# Patient Record
Sex: MALE | Race: BLACK OR AFRICAN AMERICAN | NOT HISPANIC OR LATINO | Employment: FULL TIME | URBAN - METROPOLITAN AREA
[De-identification: names, ages, dates, MRNs, and addresses within clinical notes are randomized per-mention and may not be internally consistent; named-entity substitution may affect disease eponyms.]

---
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
## 2019-12-27 ENCOUNTER — APPOINTMENT (OUTPATIENT)
Dept: GENERAL RADIOLOGY | Facility: CLINIC | Age: 19
End: 2019-12-27
Attending: INTERNAL MEDICINE

## 2019-12-27 ENCOUNTER — HOSPITAL ENCOUNTER (EMERGENCY)
Facility: CLINIC | Age: 19
Discharge: HOME | End: 2019-12-27
Attending: INTERNAL MEDICINE | Admitting: INTERNAL MEDICINE

## 2019-12-27 VITALS
WEIGHT: 214.29 LBS | HEIGHT: 74 IN | SYSTOLIC BLOOD PRESSURE: 140 MMHG | OXYGEN SATURATION: 100 % | DIASTOLIC BLOOD PRESSURE: 80 MMHG | RESPIRATION RATE: 16 BRPM | BODY MASS INDEX: 27.5 KG/M2 | TEMPERATURE: 97.2 F

## 2019-12-27 DIAGNOSIS — M54.50 ACUTE MIDLINE LOW BACK PAIN WITHOUT SCIATICA: ICD-10-CM

## 2019-12-27 PROCEDURE — 99283 EMERGENCY DEPT VISIT LOW MDM: CPT

## 2019-12-27 PROCEDURE — 72100 X-RAY EXAM L-S SPINE 2/3 VWS: CPT

## 2019-12-27 RX ORDER — IBUPROFEN 800 MG/1
800 TABLET, FILM COATED ORAL EVERY 8 HOURS PRN
Qty: 15 TABLET | Refills: 0 | Status: ON HOLD | OUTPATIENT
Start: 2019-12-27 | End: 2022-12-22

## 2019-12-27 ASSESSMENT — ENCOUNTER SYMPTOMS
WEAKNESS: 0
BACK PAIN: 1
ROS GI COMMENTS: NO BOWEL INCONTINENCE

## 2019-12-27 ASSESSMENT — MIFFLIN-ST. JEOR: SCORE: 2051.75

## 2019-12-27 NOTE — ED PROVIDER NOTES
"  History     Chief Complaint:  Back Pain    The history is provided by the patient.      Kristine Bravo is a 19 year old male who presents to the emergency department today for evaluation of back pain. The patient reports that he was playing basketball yesterday and when he jumped up he felt a sudden sharp pain in his lower back, there was no trauma or collision involved in the injury. He states the pain is intensified by moving his legs, but the pain does not radiate down into the lower extremities. The patient denies having any weakness and has not had any bowel or bladder incontinence. He states he has no history of back pain.     Allergies:  No Known Drug Allergies     Medications:    Albuterol inhaler     Past Medical History:    Asthma       Past Surgical History:    Surgical history reviewed. No pertinent surgical history.     Family History:    Family history reviewed. No pertinent family history.     Social History:  The patient was unaccompanied to the ED.  Smoking Status: Never Smoker  Smokeless Tobacco: Former User   Alcohol Use: Negative    Marital Status:  Single  The patient plays basketball.     Review of Systems   Gastrointestinal:        No bowel incontinence    Genitourinary:        No bladder incontinence    Musculoskeletal: Positive for back pain.        No leg pain    Neurological: Negative for weakness.         Physical Exam     Patient Vitals for the past 24 hrs:   BP Temp Temp src Heart Rate Resp SpO2 Height Weight   12/27/19 1126 (!) 140/80 97.2  F (36.2  C) Temporal 62 16 100 % 1.88 m (6' 2\") 97.2 kg (214 lb 4.6 oz)      Physical Exam  Abdominal:      Tenderness: There is no abdominal tenderness.   Musculoskeletal:      Lumbar back: He exhibits decreased range of motion, tenderness and spasm.      Comments: Straight leg raising negative for radicular pain   Skin:     General: Skin is warm and dry.   Neurological:      Mental Status: He is alert.      Sensory: No sensory deficit.      Deep " Tendon Reflexes: Reflexes are normal and symmetric.   Psychiatric:         Behavior: Behavior is cooperative.         Emergency Department Course     Imaging:  Radiology findings were communicated with the patient who voiced understanding of the findings.  XR, Lumbar Spine, 2-3 Views  There is normal alignment of the lumbar vertebrae. Vertebral body heights of the lumbar spine are normal. Lumbar intervertebral disc space heights are normal. There is no evidence for fracture of the lumbar spine.  Reading per radiology     Emergency Department Course:  1137 Nursing notes and vitals reviewed.  1140 I performed an exam of the patient as documented above.   1158 The patient was sent for an x-ray while in the emergency department, results above.    1236 Patient was rechecked and updated with imaging results.     Findings and plan explained to the Patient. Patient discharged home with instructions regarding supportive care, medications, and reasons to return. The importance of close follow-up was reviewed. The patient was prescribed Ibuprofen.     I personally reviewed the imaging results with the Patient and answered all related questions prior to discharge.      Impression & Plan      Medical Decision Making:  Kristine Bravo is a 19 year old male who presents to the emergency department complaining of low back pain after jumping for a basketball.  He does not have any red flags to suggest spinal cord compression or cauda equina syndrome.  No fever, sweats, or unintentional weight loss to suggest an infectious etiology.  X-ray does not show any significant spondylolisthesis or other abnormality.  I think we can manage symptomatically with close follow-up.  I will have the patient take ibuprofen, restrict activity with no basketball pending follow-up with orthopedics.  Back pain instructions, return if problems.      Diagnosis:    ICD-10-CM    1. Acute midline low back pain without sciatica M54.5        Disposition:  The  patient is discharged to home.      Discharge Medications:  New Prescriptions    IBUPROFEN (ADVIL/MOTRIN) 800 MG TABLET    Take 1 tablet (800 mg) by mouth every 8 hours as needed for moderate pain       Scribe Disclosure:  I, Ivon Parker, am serving as a scribe at 11:38 AM on 12/27/2019 to document services personally performed by Neda Camacho MD based on my observations and the provider's statements to me.    12/27/2019   [facility] EMERGENCY DEPARTMENT       Neda Camacho MD  12/27/19 4511

## 2019-12-27 NOTE — ED AVS SNAPSHOT
[facility] Emergency Department  [address]  [facility] [address]  Phone:  [phone]  Fax:  [phone]                                    Kristine Bravo   MRN: [id number]    Department:  [facility] Emergency Department   Date of Visit:  12/27/2019           After Visit Summary Signature Page    I have received my discharge instructions, and my questions have been answered. I have discussed any challenges I see with this plan with the nurse or doctor.    ..........................................................................................................................................  Patient/Patient Representative Signature      ..........................................................................................................................................  Patient Representative Print Name and Relationship to Patient    ..................................................               ................................................  Date                                   Time    ..........................................................................................................................................  Reviewed by Signature/Title    ...................................................              ..............................................  Date                                               Time          22EPIC Rev 08/18

## 2019-12-27 NOTE — DISCHARGE INSTRUCTIONS
Discharge Instructions  Back Pain  You were seen today for back pain. Back pain can have many causes, but most will get better without surgery or other specific treatment. Sometimes there is a herniated ( slipped ) disc. We don t usually do MRI scans to look for these right away, since most herniated discs will get better on their own with time.  Today, we did not find any evidence that your back pain was caused by a serious condition, such as an infection, fracture, or tumor. However, sometimes symptoms develop over time and cannot be found during an emergency visit, so it is very important that you follow up with your primary doctor.  Return to the Emergency Department if:  You develop a fever with your back pain.   You have weakness or change in sensation in one or both legs.  You lose control of your bowels or bladder, or can t empty your bladder.  Your pain gets much worse.     Follow-up with your doctor:  Unless your pain has completely gone away, please make an appointment with your doctor within one week.  You may need further management of your back pain, such as more pain medication, imaging such as an X-ray or MRI, or physical therapy.    What can I do to help myself?  Remain Active -- People are often afraid that they will hurt their back further or delay recovery by remaining active, but this is one of the best things you can do for your back. In fact, prolonged bed rest is not recommended. Studies have shown that people with low back pain recover faster when they remain active. Movement helps to bring blood flow to the muscles and relieve muscle spasms as well as preventing loss of muscle strength.  Heat -- Using a heating pad can help with low back pain during the first few weeks. Do not sleep with a heating pad, as you can be burned.   Pain medications - You may take a pain medication such as Tylenol  (acetaminophen), Advil , Nuprin  (ibuprofen) or Aleve  (naproxen).  If you have been given a  narcotic such as Vicodin  (hydrocodone with acetaminophen), Percocet  (oxycodone with acetaminophen), codeine, or a muscle relaxant such as Flexeril  (cyclobenzaprine) or Soma  (carisoprodol), do not drive for four hours after you have taken it. If the narcotic contains Tylenol  (acetaminophen), do not take Tylenol  with it. All narcotics will cause constipation, so eat a high fiber diet.   If you were given a prescription for medicine here today, be sure to read all of the information (including the package insert) that comes with your prescription.  This will include important information about the medicine, its side effects, and any warnings that you need to know about.  The pharmacist who fills the prescription can provide more information and answer questions you may have about the medicine.  If you have questions or concerns that the pharmacist cannot address, please call or return to the Emergency Department.   Opioid Medication Information    Pain medications are among the most commonly prescribed medicines, so we are including this information for all our patients. If you did not receive pain medication or get a prescription for pain medicine, you can ignore it.     You may have been given a prescription for an opioid (narcotic) pain medicine and/or have received a pain medicine while here in the Emergency Department. These medicines can make you drowsy or impaired. You must not drive, operate dangerous equipment, or engage in any other dangerous activities while taking these medications. If you drive while taking these medications, you could be arrested for DUI, or driving under the influence. Do not drink any alcohol while you are taking these medications.     Opioid pain medications can cause addiction. If you have a history of chemical dependency of any type, you are at a higher risk of becoming addicted to pain medications.  Only take these prescribed medications to treat your pain when all other  options have been tried. Take it for as short a time and as few doses as possible. Store your pain pills in a secure place, as they are frequently stolen and provide a dangerous opportunity for children or visitors in your house to start abusing these powerful medications. We will not replace any lost or stolen medicine.  As soon as your pain is better, you should flush all your remaining medication.     Many prescription pain medications contain Tylenol  (acetaminophen), including Vicodin , Tylenol #3 , Norco , Lortab , and Percocet .  You should not take any extra pills of Tylenol  if you are using these prescription medications or you can get very sick.  Do not ever take more than 3000 mg of acetaminophen in any 24 hour period.    All opioids tend to cause constipation. Drink plenty of water and eat foods that have a lot of fiber, such as fruits, vegetables, prune juice, apple juice and high fiber cereal.  Take a laxative if you don t move your bowels at least every other day. Miralax , Milk of Magnesia, Colace , or Senna  can be used to keep you regular.      Remember that you can always come back to the Emergency Department if you are not able to see your regular doctor in the amount of time listed above, if you get any new symptoms, or if there is anything that worries you.

## 2020-03-10 ENCOUNTER — HEALTH MAINTENANCE LETTER (OUTPATIENT)
Age: 20
End: 2020-03-10

## 2020-10-08 NOTE — TELEPHONE ENCOUNTER
FUTURE VISIT INFORMATION      FUTURE VISIT INFORMATION:    Date: 11.6.20    Time: 9:45    Location: [facility]  REFERRAL INFORMATION:    Referring provider:  N/A    Referring providers clinic:  N/A    Reason for visit/diagnosis  hair thinning, per patient, self refd, med recs located at [facility] and [facility].     RECORDS REQUESTED FROM:       Clinic name Comments Records Status Imaging Status    No previous records

## 2020-11-06 ENCOUNTER — PRE VISIT (OUTPATIENT)
Dept: DERMATOLOGY | Facility: CLINIC | Age: 20
End: 2020-11-06

## 2021-04-24 ENCOUNTER — HEALTH MAINTENANCE LETTER (OUTPATIENT)
Age: 21
End: 2021-04-24

## 2021-10-09 ENCOUNTER — HEALTH MAINTENANCE LETTER (OUTPATIENT)
Age: 21
End: 2021-10-09

## 2022-02-03 DIAGNOSIS — M26.29 APERTOGNATHIA: ICD-10-CM

## 2022-02-03 DIAGNOSIS — M26.212: Primary | ICD-10-CM

## 2022-03-18 DIAGNOSIS — Z11.59 ENCOUNTER FOR SCREENING FOR OTHER VIRAL DISEASES: Primary | ICD-10-CM

## 2022-05-16 ENCOUNTER — HEALTH MAINTENANCE LETTER (OUTPATIENT)
Age: 22
End: 2022-05-16

## 2022-09-11 ENCOUNTER — HEALTH MAINTENANCE LETTER (OUTPATIENT)
Age: 22
End: 2022-09-11

## 2022-10-15 ENCOUNTER — HOSPITAL ENCOUNTER (EMERGENCY)
Facility: CLINIC | Age: 22
Discharge: HOME | End: 2022-10-15
Attending: EMERGENCY MEDICINE | Admitting: EMERGENCY MEDICINE
Payer: COMMERCIAL

## 2022-10-15 ENCOUNTER — APPOINTMENT (OUTPATIENT)
Dept: GENERAL RADIOLOGY | Facility: CLINIC | Age: 22
End: 2022-10-15
Attending: EMERGENCY MEDICINE
Payer: COMMERCIAL

## 2022-10-15 VITALS
SYSTOLIC BLOOD PRESSURE: 134 MMHG | RESPIRATION RATE: 18 BRPM | OXYGEN SATURATION: 99 % | TEMPERATURE: 98 F | HEART RATE: 99 BPM | DIASTOLIC BLOOD PRESSURE: 79 MMHG

## 2022-10-15 DIAGNOSIS — S63.502A WRIST SPRAIN, LEFT, INITIAL ENCOUNTER: ICD-10-CM

## 2022-10-15 PROCEDURE — 73110 X-RAY EXAM OF WRIST: CPT | Mod: LT

## 2022-10-15 PROCEDURE — 250N000013 HC RX MED GY IP 250 OP 250 PS 637: Performed by: EMERGENCY MEDICINE

## 2022-10-15 PROCEDURE — 99283 EMERGENCY DEPT VISIT LOW MDM: CPT

## 2022-10-15 RX ORDER — ACETAMINOPHEN 325 MG/1
650 TABLET ORAL ONCE
Status: COMPLETED | OUTPATIENT
Start: 2022-10-15 | End: 2022-10-15

## 2022-10-15 RX ADMIN — ACETAMINOPHEN 650 MG: 325 TABLET, FILM COATED ORAL at 19:43

## 2022-10-15 ASSESSMENT — ENCOUNTER SYMPTOMS: ARTHRALGIAS: 1

## 2022-10-15 NOTE — ED TRIAGE NOTES
Pt reports that he was playing basketball and fell onto his left arm injuring his wrist. No deformity noted, PT CMS intact, injury happened 45 minutes PTA. VSS

## 2022-10-16 NOTE — ED PROVIDER NOTES
History   Chief Complaint:  Left Wrist Pain      The history is provided by the patient.      Kristine Bravo is a 22 year old male with history of asthma who presents with left wrist pain after falling onto his left arm while playing basketball approximately 1.5 hours ago. He denies elbow pain.     Review of Systems   Musculoskeletal: Positive for arthralgias (Left wrist).        - Elbow pain   neg numbness  Neg finger weakness  Neg skin laceration    Allergies:  No Known Allergies    Medications:  Albuterol neb solution  Albuterol Inhaler  Hydrocodone-acetaminophen  Loratadine   Montelukast   Prednisone  Cetirizine  Finasteride    Past Medical History:     Seasonal allergies  Asthma    Social History:  PCP: No Ref-Primary, Physician   Patient presents alone  Patient arrived by car    Physical Exam     Patient Vitals for the past 24 hrs:   BP Temp Temp src Pulse Resp SpO2   10/15/22 1846 134/79 98  F (36.7  C) Temporal 99 18 99 %       Physical Exam  Gen: alert, answers all questions appropriately  Left Upper Ext  MSK: full AROM all 5 digits, no rotational deformity, no snuff box tenderness, dorsal tenderness  Skin: no break in skin, no redness  Neuro: normal sensation to light touch in all 5 digits, normal strength and ROM of fingers  CV: 2+ radial pulse    Emergency Department Course     Imaging:  XR Wrist Left G/E 3 Views   Final Result   IMPRESSION: Normal joint spaces and alignment. No fracture.        Report per radiology    Emergency Department Course:     Reviewed:  I reviewed nursing notes, vitals, past medical history and Care Everywhere    Assessments:  1951 I obtained history and examined the patient as noted above.     Interventions:  1943 Acetaminophen 650 mg PO    Disposition:  The patient was discharged to home.     Impression & Plan     Medical Decision Making:  Kristine Bravo is a 22 year old male presents for evaluation after fall.  Signs and symptoms are consistent with a wrist sprain.  A broad  differential was considered including sprain, strain, fracture, tendon rupture, nerve impingement/compromise, referred pain. Supportive outpatient management is indicated.  Rest, ice, and elevation treatment was discussed with the patient.   Close follow-up with patient's primary care physician per discharge precautions. Contusion discharge instructions given for home.     Instructions to patient:  Wear wrist brace for 1 week.  Have recheck exam with primary care doctor.  No lifting left arm x1 week.  Take ibuprofen 600 mg 3x per day as needed.  This will provide both pain control and fight against inflammation.      Diagnosis:    ICD-10-CM    1. Wrist sprain, left, initial encounter  S63.502A         Scribe Disclosure:  I, Katja Mullins, am serving as a scribe at 7:50 PM on 10/15/2022 to document services personally performed by Maritza Albert MD based on my observations and the provider's statements to me.            Maritza Albert MD  10/15/22 1091

## 2022-10-16 NOTE — DISCHARGE INSTRUCTIONS
Wear wrist brace for 1 week.  Have recheck exam with primary care doctor.  No lifting left arm x1 week.  Take ibuprofen 600 mg 3x per day as needed.  This will provide both pain control and fight against inflammation.

## 2022-11-04 RX ORDER — PHENTERMINE HYDROCHLORIDE 37.5 MG/1
37.5 CAPSULE ORAL EVERY MORNING
Status: ON HOLD | COMMUNITY
End: 2022-12-22

## 2022-11-04 RX ORDER — FLUTICASONE PROPIONATE 50 MCG
SPRAY, SUSPENSION (ML) NASAL
COMMUNITY
Start: 2022-03-16

## 2022-11-04 RX ORDER — FINASTERIDE 1 MG/1
1 TABLET, FILM COATED ORAL DAILY
COMMUNITY
Start: 2022-09-01

## 2022-11-04 RX ORDER — CETIRIZINE HYDROCHLORIDE 10 MG/1
10 TABLET ORAL DAILY
COMMUNITY
Start: 2022-09-14

## 2022-11-04 NOTE — PRE-PROCEDURE
ORAL & MAXILLOFACIAL SURGERY   PREOPERATIVE  NOTE:  Kristine Bravo,  MRN: [id number],  : 2000      Date of Procedure:   2022    Pre-Operative Diagnosis:  1.Facial dysmorphia     Planned Procedure:  1.[place] I osteotomy  2.Bilateral mandibular sagittal split osteotomy     Planned Anesthesia: General via oral endotracheal tube    Attending Surgeon:  Dr. Marrero    Resident Surgeon:  Yana Calhoun DDS    Resident Assistants:  VIRIDIANA Robb DDS    Consent:  Written and verbal consent obtained from patient previously. Discussion included  risks/complications including but not limited post-operative pain, swelling, bleeding,  infection, hardware failure, nonunion, malunion, dehiscence of wounds,  temporary/permanent paresthesia/anesthesia of CN V3 mental nerve distribution/CN V2  maxillary nerve distribution, damage to CN VII (motor to muscles of facial expression)  with temporary or permanent paralysis, scar formation, malocclusion, failure to resolve  chief complaint, or need for additional procedures (occlusal equilibration/endodontic  therapy). Patient agrees to procedure as written, signed consent in chart.      Matti Santillan DDS  Oral & Maxillofacial Surgery, PGY-2    ---------------------------------------------------------------------------------------------------------------------

## 2022-12-08 NOTE — H&P
ORAL & MAXILLOFACIAL SURGERY   HISTORY & PHYSICAL  Kristine Bravo,  MRN: [id number],  : 2000        SEBASTIÁN Carmona is a 22-year-old M who presents to the Oral and Maxillofacial Surgery Department for evaluation of his skeletal-dental deformity. He was referred by his orthodontist, Dr. Sinha for preorthognathic evaluation. The patient voices multiple functional concerns regarding inability to properly bite, chew, and swallow food primarily due to inability to bring his front teeth together and states that a portion of the food is often left behind when attempting to bite into something. Patient reports occasional pain/discomfort with his periauricular area, neck, shoulders, teeth, eyes, and maxillary sinuses. He presents today for pre-operative evaluation.       HISTORY  Past Medical History:   Past Medical History:   Diagnosis Date     Uncomplicated asthma      Past Surgical History: History reviewed. No pertinent surgical history.  Medications:   No current facility-administered medications on file prior to encounter.  albuterol (PROVENTIL) (2.5 MG/3ML) 0.083% neb solution, Inhale 2.5 mg into the lungs  albuterol (VENTOLIN HFA) 108 (90 BASE) MCG/ACT Inhaler, Inhale 1-2 puffs into the lungs every 4 hours as needed for shortness of breath / dyspnea or wheezing  cetirizine (ZYRTEC) 10 MG tablet, Take 10 mg by mouth daily  finasteride (PROPECIA) 1 MG tablet, Take 1 tablet by mouth daily  fluticasone (FLONASE) 50 MCG/ACT nasal spray,   phentermine (ADIPEX-P) 37.5 MG capsule, Take 37.5 mg by mouth every morning  HYDROcodone-acetaminophen (NORCO) 5-325 MG per tablet, Take 1-2 tablets by mouth every 4 hours as needed for moderate to severe pain  ibuprofen (ADVIL/MOTRIN) 800 MG tablet, Take 1 tablet (800 mg) by mouth every 8 hours as needed for moderate pain  loratadine (CLARITIN) 10 MG tablet, Take 10 mg by mouth every 24 hours  montelukast (SINGULAIR) 10 MG tablet, Take 10 mg by mouth  predniSONE (DELTASONE) 20  "MG tablet, Take two tablets (= 40mg) each day for 5 (five) days     Patient reports he is no longer taking phentermine, and has stopped since 11/9/2022 due to delay of surgery. He reports he is now taking [id number] injections for weight loss management as well as Naltrexone for weight management and not as an opioid antagonist.  He reports that he is still taking cetrirzine and finasteride. Denies taking other medications at this time.     Allergies: No Known Allergies  Social History:   Social History     Socioeconomic History     Marital status: Single     Spouse name: Not on file     Number of children: Not on file     Years of education: Not on file     Highest education level: Not on file   Occupational History     Not on file   Tobacco Use     Smoking status: Never     Smokeless tobacco: Not on file   Substance and Sexual Activity     Alcohol use: No     Drug use: No     Sexual activity: Yes   Other Topics Concern     Not on file   Social History Narrative     Not on file     Social Determinants of Health     Financial Resource Strain: Not on file   Food Insecurity: Not on file   Transportation Needs: Not on file   Physical Activity: Not on file   Stress: Not on file   Social Connections: Not on file   Intimate Partner Violence: Not on file   Housing Stability: Not on file       REVIEW OF SYSTEMS  ROS reviewed and negative aside from listed in HPI    PHYSICAL EXAM  Vital Signs:   BP: 121/76  P:59  SpO2:99%  Height 6'2\"  Weight 220lbs    GEN: WD/WN male, NAD  HEENT Normocephalic, atraumatic, EOMI, PERRL no extraoral masses or lesion. No clicking/popping/crepitus of the bilateral temporomandibular joints, no preauricular tenderness noted, myalgia   Mallampati I  Otto classification I  Oral exam: ALBERTO 52 mm. No lesions of the maxillary or mandibular vestibules/buccal mucosa. Mucosa of the hard/soft palate, tongue, floor of mouth, posterior oropharynx pink and without masses/lesions. Floor of mouth soft, " non-tender, non-distended. Uvula midline.   CV: RRR, no M/G/R, warm and well-perfused  PULM: CTAB, breathing comfortably on room air  GI: Soft, ND/NT  MSK: BOSTON, no peripheral extremity edema  NEURO: AAOx4, CN II-XII intact bilaterally  PSYCH: Appropriate mood and affect            IMAGING RESULTS (Include outside hospital results)  EXAMINATION: CBCT scan of both the arches.    IMAGE QUALITY: Optimal for diagnosis.     IMAGING INDICATIONS:  To evaluate maxillofacial structures in preparation for orthognathic surgery.     PREVIOUS STUDIES: Previous CBCT scans acquired 1- and 2-8-2022 are available for comparison.     DENTITION:  The permanent maxillary lateral incisors appear missing. The primary maxillary canines appear retained. This accounting is different from previously reported.     DENTAL FINDINGS:   - Canine to canine anterior open bite.   - There is advanced apical root resorption of the retained primary maxillary canines.      OSSEOUS FINDINGS: Visible bony findings appear normal.      SINUSES and NASAL FOSSA: Visible sinonasal findings appear normal.      TMJs: Both condylar heads demonstrate slight flattening consistent with remodeling. The findings appear similar to that noted previously.      OTHER FINDINGS: Other facial bones, where visualized, appear normal.     RADIOGRAPHIC IMPRESSIONS:   1. Canine to canine anterior open bite.  2. 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  3. TMJ remodeling. This finding is considered within normal limits in the absence of TMJ symptoms.     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     PLAN  - OR for Lefort I and BSSO on 12/21/22  - Patient is to stop taking his Naltrexone and [id number] 7 DAYS prior to surgery.   - Continue taking finasteride and  cetrizine   - VSP plan given to patient      Discussed with chief resident and staff.    Yana Calhoun DDS  Oral & Maxillofacial Surgery, PGY-4  Pager: [phone]

## 2022-12-19 ENCOUNTER — LAB (OUTPATIENT)
Dept: LAB | Facility: CLINIC | Age: 22
End: 2022-12-19
Attending: FAMILY MEDICINE
Payer: COMMERCIAL

## 2022-12-19 DIAGNOSIS — Z01.818 PRE-OPERATIVE GENERAL PHYSICAL EXAMINATION: ICD-10-CM

## 2022-12-19 PROCEDURE — U0005 INFEC AGEN DETEC AMPLI PROBE: HCPCS

## 2022-12-19 PROCEDURE — U0003 INFECTIOUS AGENT DETECTION BY NUCLEIC ACID (DNA OR RNA); SEVERE ACUTE RESPIRATORY SYNDROME CORONAVIRUS 2 (SARS-COV-2) (CORONAVIRUS DISEASE [COVID-19]), AMPLIFIED PROBE TECHNIQUE, MAKING USE OF HIGH THROUGHPUT TECHNOLOGIES AS DESCRIBED BY CMS-2020-01-R: HCPCS

## 2022-12-20 LAB — SARS-COV-2 RNA RESP QL NAA+PROBE: NEGATIVE

## 2022-12-21 ENCOUNTER — ANESTHESIA EVENT (OUTPATIENT)
Dept: SURGERY | Facility: CLINIC | Age: 22
End: 2022-12-21
Payer: COMMERCIAL

## 2022-12-21 ENCOUNTER — HOSPITAL ENCOUNTER (INPATIENT)
Facility: CLINIC | Age: 22
LOS: 1 days | Discharge: HOME | End: 2022-12-22
Attending: DENTIST | Admitting: DENTIST
Payer: COMMERCIAL

## 2022-12-21 ENCOUNTER — ANESTHESIA (OUTPATIENT)
Dept: SURGERY | Facility: CLINIC | Age: 22
End: 2022-12-21
Payer: COMMERCIAL

## 2022-12-21 DIAGNOSIS — M26.02 MAXILLARY HYPOPLASIA: Primary | ICD-10-CM

## 2022-12-21 LAB — GLUCOSE BLDC GLUCOMTR-MCNC: 105 MG/DL (ref 70–99)

## 2022-12-21 PROCEDURE — 250N000009 HC RX 250: Performed by: DENTIST

## 2022-12-21 PROCEDURE — 250N000011 HC RX IP 250 OP 636: Performed by: NURSE ANESTHETIST, CERTIFIED REGISTERED

## 2022-12-21 PROCEDURE — 250N000024 HC ISOFLURANE, PER MIN: Performed by: DENTIST

## 2022-12-21 PROCEDURE — 250N000011 HC RX IP 250 OP 636: Performed by: DENTIST

## 2022-12-21 PROCEDURE — C1713 ANCHOR/SCREW BN/BN,TIS/BN: HCPCS | Performed by: DENTIST

## 2022-12-21 PROCEDURE — 258N000003 HC RX IP 258 OP 636: Performed by: DENTIST

## 2022-12-21 PROCEDURE — 272N000001 HC OR GENERAL SUPPLY STERILE: Performed by: DENTIST

## 2022-12-21 PROCEDURE — 360N000077 HC SURGERY LEVEL 4, PER MIN: Performed by: DENTIST

## 2022-12-21 PROCEDURE — 999N000141 HC STATISTIC PRE-PROCEDURE NURSING ASSESSMENT: Performed by: DENTIST

## 2022-12-21 PROCEDURE — 999N000157 HC STATISTIC RCP TIME EA 10 MIN

## 2022-12-21 PROCEDURE — 250N000013 HC RX MED GY IP 250 OP 250 PS 637: Performed by: DENTIST

## 2022-12-21 PROCEDURE — 250N000013 HC RX MED GY IP 250 OP 250 PS 637: Performed by: NURSE ANESTHETIST, CERTIFIED REGISTERED

## 2022-12-21 PROCEDURE — 250N000009 HC RX 250: Performed by: NURSE ANESTHETIST, CERTIFIED REGISTERED

## 2022-12-21 PROCEDURE — 710N000010 HC RECOVERY PHASE 1, LEVEL 2, PER MIN: Performed by: DENTIST

## 2022-12-21 PROCEDURE — 0NSV04Z REPOSITION LEFT MANDIBLE WITH INTERNAL FIXATION DEVICE, OPEN APPROACH: ICD-10-PCS | Performed by: DENTIST

## 2022-12-21 PROCEDURE — 250N000009 HC RX 250: Performed by: STUDENT IN AN ORGANIZED HEALTH CARE EDUCATION/TRAINING PROGRAM

## 2022-12-21 PROCEDURE — 250N000013 HC RX MED GY IP 250 OP 250 PS 637: Performed by: STUDENT IN AN ORGANIZED HEALTH CARE EDUCATION/TRAINING PROGRAM

## 2022-12-21 PROCEDURE — 0NSR04Z REPOSITION MAXILLA WITH INTERNAL FIXATION DEVICE, OPEN APPROACH: ICD-10-PCS | Performed by: DENTIST

## 2022-12-21 PROCEDURE — 258N000003 HC RX IP 258 OP 636: Performed by: NURSE ANESTHETIST, CERTIFIED REGISTERED

## 2022-12-21 PROCEDURE — 120N000002 HC R&B MED SURG/OB UMMC

## 2022-12-21 PROCEDURE — 0NST04Z REPOSITION RIGHT MANDIBLE WITH INTERNAL FIXATION DEVICE, OPEN APPROACH: ICD-10-PCS | Performed by: DENTIST

## 2022-12-21 PROCEDURE — 370N000017 HC ANESTHESIA TECHNICAL FEE, PER MIN: Performed by: DENTIST

## 2022-12-21 DEVICE — IMP SCR SYN MATRIX COARSE PITCH 1.85X16MM ST 04.511.216.01: Type: IMPLANTABLE DEVICE | Site: MANDIBLE | Status: FUNCTIONAL

## 2022-12-21 DEVICE — IMPLANTABLE DEVICE: Type: IMPLANTABLE DEVICE | Site: MAXILLA | Status: FUNCTIONAL

## 2022-12-21 RX ORDER — NALOXONE HYDROCHLORIDE 0.4 MG/ML
0.2 INJECTION, SOLUTION INTRAMUSCULAR; INTRAVENOUS; SUBCUTANEOUS
Status: DISCONTINUED | OUTPATIENT
Start: 2022-12-21 | End: 2022-12-22 | Stop reason: HOSPADM

## 2022-12-21 RX ORDER — GABAPENTIN 250 MG/5ML
100 SOLUTION ORAL EVERY 8 HOURS
Status: DISCONTINUED | OUTPATIENT
Start: 2022-12-21 | End: 2022-12-22 | Stop reason: HOSPADM

## 2022-12-21 RX ORDER — OXYCODONE HCL 5 MG/5 ML
5-10 SOLUTION, ORAL ORAL EVERY 6 HOURS PRN
Status: DISCONTINUED | OUTPATIENT
Start: 2022-12-21 | End: 2022-12-22 | Stop reason: HOSPADM

## 2022-12-21 RX ORDER — PROCHLORPERAZINE 25 MG
25 SUPPOSITORY, RECTAL RECTAL EVERY 12 HOURS PRN
Status: DISCONTINUED | OUTPATIENT
Start: 2022-12-21 | End: 2022-12-22 | Stop reason: HOSPADM

## 2022-12-21 RX ORDER — BENZOCAINE/MENTHOL 6 MG-10 MG
LOZENGE MUCOUS MEMBRANE EVERY 6 HOURS PRN
Status: DISCONTINUED | OUTPATIENT
Start: 2022-12-21 | End: 2022-12-22 | Stop reason: HOSPADM

## 2022-12-21 RX ORDER — HYDROMORPHONE HYDROCHLORIDE 1 MG/ML
0.2 INJECTION, SOLUTION INTRAMUSCULAR; INTRAVENOUS; SUBCUTANEOUS EVERY 5 MIN PRN
Status: DISCONTINUED | OUTPATIENT
Start: 2022-12-21 | End: 2022-12-21 | Stop reason: HOSPADM

## 2022-12-21 RX ORDER — CHLORHEXIDINE GLUCONATE ORAL RINSE 1.2 MG/ML
10 SOLUTION DENTAL ONCE
Status: COMPLETED | OUTPATIENT
Start: 2022-12-21 | End: 2022-12-21

## 2022-12-21 RX ORDER — ONDANSETRON 2 MG/ML
4 INJECTION INTRAMUSCULAR; INTRAVENOUS EVERY 6 HOURS PRN
Status: DISCONTINUED | OUTPATIENT
Start: 2022-12-21 | End: 2022-12-22 | Stop reason: HOSPADM

## 2022-12-21 RX ORDER — OXYMETAZOLINE HYDROCHLORIDE 0.05 G/100ML
2 SPRAY NASAL 2 TIMES DAILY
Status: DISCONTINUED | OUTPATIENT
Start: 2022-12-21 | End: 2022-12-22 | Stop reason: HOSPADM

## 2022-12-21 RX ORDER — CEFAZOLIN SODIUM/WATER 2 G/20 ML
2 SYRINGE (ML) INTRAVENOUS
Status: DISCONTINUED | OUTPATIENT
Start: 2022-12-21 | End: 2022-12-21

## 2022-12-21 RX ORDER — ACETAMINOPHEN 325 MG/10.15ML
650 LIQUID ORAL EVERY 6 HOURS
Status: DISCONTINUED | OUTPATIENT
Start: 2022-12-21 | End: 2022-12-22 | Stop reason: HOSPADM

## 2022-12-21 RX ORDER — ONDANSETRON 4 MG/1
4 TABLET, ORALLY DISINTEGRATING ORAL EVERY 6 HOURS PRN
Status: DISCONTINUED | OUTPATIENT
Start: 2022-12-21 | End: 2022-12-22 | Stop reason: HOSPADM

## 2022-12-21 RX ORDER — ESMOLOL HYDROCHLORIDE 10 MG/ML
INJECTION INTRAVENOUS PRN
Status: DISCONTINUED | OUTPATIENT
Start: 2022-12-21 | End: 2022-12-21

## 2022-12-21 RX ORDER — LIDOCAINE 40 MG/G
CREAM TOPICAL
Status: DISCONTINUED | OUTPATIENT
Start: 2022-12-21 | End: 2022-12-22 | Stop reason: HOSPADM

## 2022-12-21 RX ORDER — PSEUDOEPHEDRINE HCL 60 MG
60 TABLET ORAL EVERY 6 HOURS
Status: DISCONTINUED | OUTPATIENT
Start: 2022-12-21 | End: 2022-12-22 | Stop reason: HOSPADM

## 2022-12-21 RX ORDER — AMPICILLIN AND SULBACTAM 2; 1 G/1; G/1
3 INJECTION, POWDER, FOR SOLUTION INTRAMUSCULAR; INTRAVENOUS EVERY 6 HOURS
Status: DISCONTINUED | OUTPATIENT
Start: 2022-12-21 | End: 2022-12-22 | Stop reason: HOSPADM

## 2022-12-21 RX ORDER — SODIUM CHLORIDE, SODIUM LACTATE, POTASSIUM CHLORIDE, CALCIUM CHLORIDE 600; 310; 30; 20 MG/100ML; MG/100ML; MG/100ML; MG/100ML
INJECTION, SOLUTION INTRAVENOUS CONTINUOUS
Status: DISCONTINUED | OUTPATIENT
Start: 2022-12-21 | End: 2022-12-22 | Stop reason: HOSPADM

## 2022-12-21 RX ORDER — CEFAZOLIN SODIUM/WATER 2 G/20 ML
2 SYRINGE (ML) INTRAVENOUS
Status: COMPLETED | OUTPATIENT
Start: 2022-12-21 | End: 2022-12-21

## 2022-12-21 RX ORDER — CHLORHEXIDINE GLUCONATE ORAL RINSE 1.2 MG/ML
15 SOLUTION DENTAL 2 TIMES DAILY
Status: DISCONTINUED | OUTPATIENT
Start: 2022-12-21 | End: 2022-12-22 | Stop reason: HOSPADM

## 2022-12-21 RX ORDER — PROCHLORPERAZINE MALEATE 5 MG
10 TABLET ORAL EVERY 6 HOURS PRN
Status: DISCONTINUED | OUTPATIENT
Start: 2022-12-21 | End: 2022-12-22 | Stop reason: HOSPADM

## 2022-12-21 RX ORDER — CEFAZOLIN SODIUM/WATER 2 G/20 ML
2 SYRINGE (ML) INTRAVENOUS SEE ADMIN INSTRUCTIONS
Status: DISCONTINUED | OUTPATIENT
Start: 2022-12-21 | End: 2022-12-21 | Stop reason: HOSPADM

## 2022-12-21 RX ORDER — CEFAZOLIN SODIUM/WATER 2 G/20 ML
2 SYRINGE (ML) INTRAVENOUS SEE ADMIN INSTRUCTIONS
Status: DISCONTINUED | OUTPATIENT
Start: 2022-12-21 | End: 2022-12-21

## 2022-12-21 RX ORDER — FENTANYL CITRATE 50 UG/ML
25 INJECTION, SOLUTION INTRAMUSCULAR; INTRAVENOUS EVERY 5 MIN PRN
Status: DISCONTINUED | OUTPATIENT
Start: 2022-12-21 | End: 2022-12-21 | Stop reason: HOSPADM

## 2022-12-21 RX ORDER — FENTANYL CITRATE 50 UG/ML
INJECTION, SOLUTION INTRAMUSCULAR; INTRAVENOUS PRN
Status: DISCONTINUED | OUTPATIENT
Start: 2022-12-21 | End: 2022-12-21

## 2022-12-21 RX ORDER — SODIUM CHLORIDE, SODIUM LACTATE, POTASSIUM CHLORIDE, CALCIUM CHLORIDE 600; 310; 30; 20 MG/100ML; MG/100ML; MG/100ML; MG/100ML
INJECTION, SOLUTION INTRAVENOUS CONTINUOUS PRN
Status: DISCONTINUED | OUTPATIENT
Start: 2022-12-21 | End: 2022-12-21

## 2022-12-21 RX ORDER — SCOLOPAMINE TRANSDERMAL SYSTEM 1 MG/1
1 PATCH, EXTENDED RELEASE TRANSDERMAL ONCE
Status: COMPLETED | OUTPATIENT
Start: 2022-12-21 | End: 2022-12-22

## 2022-12-21 RX ORDER — FENTANYL CITRATE 50 UG/ML
50 INJECTION, SOLUTION INTRAMUSCULAR; INTRAVENOUS EVERY 5 MIN PRN
Status: DISCONTINUED | OUTPATIENT
Start: 2022-12-21 | End: 2022-12-21 | Stop reason: HOSPADM

## 2022-12-21 RX ORDER — BUPIVACAINE HYDROCHLORIDE AND EPINEPHRINE 2.5; 5 MG/ML; UG/ML
INJECTION, SOLUTION INFILTRATION; PERINEURAL PRN
Status: DISCONTINUED | OUTPATIENT
Start: 2022-12-21 | End: 2022-12-21 | Stop reason: HOSPADM

## 2022-12-21 RX ORDER — MELOXICAM 7.5 MG/1
15 TABLET ORAL ONCE
Status: COMPLETED | OUTPATIENT
Start: 2022-12-21 | End: 2022-12-21

## 2022-12-21 RX ORDER — TRANEXAMIC ACID 10 MG/ML
1 INJECTION, SOLUTION INTRAVENOUS ONCE
Status: COMPLETED | OUTPATIENT
Start: 2022-12-21 | End: 2022-12-21

## 2022-12-21 RX ORDER — HYDROMORPHONE HCL IN WATER/PF 6 MG/30 ML
.2-.4 PATIENT CONTROLLED ANALGESIA SYRINGE INTRAVENOUS
Status: DISCONTINUED | OUTPATIENT
Start: 2022-12-21 | End: 2022-12-22 | Stop reason: HOSPADM

## 2022-12-21 RX ORDER — ACETAMINOPHEN 325 MG/1
975 TABLET ORAL ONCE
Status: COMPLETED | OUTPATIENT
Start: 2022-12-21 | End: 2022-12-21

## 2022-12-21 RX ORDER — ONDANSETRON 2 MG/ML
4 INJECTION INTRAMUSCULAR; INTRAVENOUS EVERY 30 MIN PRN
Status: DISCONTINUED | OUTPATIENT
Start: 2022-12-21 | End: 2022-12-21 | Stop reason: HOSPADM

## 2022-12-21 RX ORDER — SODIUM CHLORIDE, SODIUM LACTATE, POTASSIUM CHLORIDE, CALCIUM CHLORIDE 600; 310; 30; 20 MG/100ML; MG/100ML; MG/100ML; MG/100ML
INJECTION, SOLUTION INTRAVENOUS CONTINUOUS
Status: DISCONTINUED | OUTPATIENT
Start: 2022-12-21 | End: 2022-12-21 | Stop reason: HOSPADM

## 2022-12-21 RX ORDER — PROPOFOL 10 MG/ML
INJECTION, EMULSION INTRAVENOUS CONTINUOUS PRN
Status: DISCONTINUED | OUTPATIENT
Start: 2022-12-21 | End: 2022-12-21

## 2022-12-21 RX ORDER — HYDROMORPHONE HYDROCHLORIDE 1 MG/ML
0.4 INJECTION, SOLUTION INTRAMUSCULAR; INTRAVENOUS; SUBCUTANEOUS EVERY 5 MIN PRN
Status: DISCONTINUED | OUTPATIENT
Start: 2022-12-21 | End: 2022-12-21 | Stop reason: HOSPADM

## 2022-12-21 RX ORDER — KETOROLAC TROMETHAMINE 30 MG/ML
30 INJECTION, SOLUTION INTRAMUSCULAR; INTRAVENOUS EVERY 6 HOURS
Status: DISCONTINUED | OUTPATIENT
Start: 2022-12-21 | End: 2022-12-22 | Stop reason: HOSPADM

## 2022-12-21 RX ORDER — DEXAMETHASONE SODIUM PHOSPHATE 4 MG/ML
INJECTION, SOLUTION INTRA-ARTICULAR; INTRALESIONAL; INTRAMUSCULAR; INTRAVENOUS; SOFT TISSUE PRN
Status: DISCONTINUED | OUTPATIENT
Start: 2022-12-21 | End: 2022-12-21

## 2022-12-21 RX ORDER — NALOXONE HYDROCHLORIDE 0.4 MG/ML
0.4 INJECTION, SOLUTION INTRAMUSCULAR; INTRAVENOUS; SUBCUTANEOUS
Status: DISCONTINUED | OUTPATIENT
Start: 2022-12-21 | End: 2022-12-22 | Stop reason: HOSPADM

## 2022-12-21 RX ORDER — GABAPENTIN 100 MG/1
300 CAPSULE ORAL ONCE
Status: COMPLETED | OUTPATIENT
Start: 2022-12-21 | End: 2022-12-21

## 2022-12-21 RX ORDER — ONDANSETRON 4 MG/1
4 TABLET, ORALLY DISINTEGRATING ORAL EVERY 30 MIN PRN
Status: DISCONTINUED | OUTPATIENT
Start: 2022-12-21 | End: 2022-12-21 | Stop reason: HOSPADM

## 2022-12-21 RX ORDER — PROPOFOL 10 MG/ML
INJECTION, EMULSION INTRAVENOUS PRN
Status: DISCONTINUED | OUTPATIENT
Start: 2022-12-21 | End: 2022-12-21

## 2022-12-21 RX ORDER — ONDANSETRON 2 MG/ML
INJECTION INTRAMUSCULAR; INTRAVENOUS PRN
Status: DISCONTINUED | OUTPATIENT
Start: 2022-12-21 | End: 2022-12-21

## 2022-12-21 RX ORDER — LIDOCAINE HYDROCHLORIDE 20 MG/ML
INJECTION, SOLUTION INFILTRATION; PERINEURAL PRN
Status: DISCONTINUED | OUTPATIENT
Start: 2022-12-21 | End: 2022-12-21

## 2022-12-21 RX ADMIN — MELOXICAM 15 MG: 7.5 TABLET ORAL at 07:04

## 2022-12-21 RX ADMIN — GABAPENTIN 300 MG: 300 CAPSULE ORAL at 06:43

## 2022-12-21 RX ADMIN — FENTANYL CITRATE 100 MCG: 50 INJECTION, SOLUTION INTRAMUSCULAR; INTRAVENOUS at 08:36

## 2022-12-21 RX ADMIN — FENTANYL CITRATE 100 MCG: 50 INJECTION, SOLUTION INTRAMUSCULAR; INTRAVENOUS at 08:02

## 2022-12-21 RX ADMIN — SODIUM CHLORIDE, POTASSIUM CHLORIDE, SODIUM LACTATE AND CALCIUM CHLORIDE: 600; 310; 30; 20 INJECTION, SOLUTION INTRAVENOUS at 15:36

## 2022-12-21 RX ADMIN — HYDROMORPHONE HYDROCHLORIDE 0.5 MG: 1 INJECTION, SOLUTION INTRAMUSCULAR; INTRAVENOUS; SUBCUTANEOUS at 13:48

## 2022-12-21 RX ADMIN — CHLORHEXIDINE GLUCONATE 15 ML: 1.2 SOLUTION ORAL at 19:11

## 2022-12-21 RX ADMIN — HYDROMORPHONE HYDROCHLORIDE 0.5 MG: 1 INJECTION, SOLUTION INTRAMUSCULAR; INTRAVENOUS; SUBCUTANEOUS at 09:01

## 2022-12-21 RX ADMIN — CHLORHEXIDINE GLUCONATE 10 ML: 1.2 SOLUTION ORAL at 06:43

## 2022-12-21 RX ADMIN — OXYCODONE HYDROCHLORIDE 5 MG: 5 SOLUTION ORAL at 21:58

## 2022-12-21 RX ADMIN — SODIUM CHLORIDE, POTASSIUM CHLORIDE, SODIUM LACTATE AND CALCIUM CHLORIDE: 600; 310; 30; 20 INJECTION, SOLUTION INTRAVENOUS at 10:57

## 2022-12-21 RX ADMIN — ONDANSETRON 4 MG: 2 INJECTION INTRAMUSCULAR; INTRAVENOUS at 13:08

## 2022-12-21 RX ADMIN — PROPOFOL 100 MCG/KG/MIN: 10 INJECTION, EMULSION INTRAVENOUS at 08:52

## 2022-12-21 RX ADMIN — PSEUDOEPHEDRINE HYDROCHLORIDE 60 MG: 60 TABLET ORAL at 18:30

## 2022-12-21 RX ADMIN — MIDAZOLAM 2 MG: 1 INJECTION INTRAMUSCULAR; INTRAVENOUS at 07:37

## 2022-12-21 RX ADMIN — HYDROMORPHONE HYDROCHLORIDE 0.5 MG: 1 INJECTION, SOLUTION INTRAMUSCULAR; INTRAVENOUS; SUBCUTANEOUS at 09:08

## 2022-12-21 RX ADMIN — SODIUM CHLORIDE, POTASSIUM CHLORIDE, SODIUM LACTATE AND CALCIUM CHLORIDE: 600; 310; 30; 20 INJECTION, SOLUTION INTRAVENOUS at 07:45

## 2022-12-21 RX ADMIN — GABAPENTIN 100 MG: 250 SOLUTION ORAL at 18:30

## 2022-12-21 RX ADMIN — LIDOCAINE HYDROCHLORIDE 100 MG: 20 INJECTION, SOLUTION INFILTRATION; PERINEURAL at 07:51

## 2022-12-21 RX ADMIN — SUGAMMADEX 250 MG: 100 INJECTION, SOLUTION INTRAVENOUS at 13:58

## 2022-12-21 RX ADMIN — OXYCODONE HYDROCHLORIDE 5 MG: 5 SOLUTION ORAL at 19:11

## 2022-12-21 RX ADMIN — Medication 2 G: at 12:00

## 2022-12-21 RX ADMIN — TRANEXAMIC ACID 1 G: 10 INJECTION, SOLUTION INTRAVENOUS at 08:20

## 2022-12-21 RX ADMIN — ESMOLOL HYDROCHLORIDE 10 MG: 10 INJECTION, SOLUTION INTRAVENOUS at 09:28

## 2022-12-21 RX ADMIN — ACETAMINOPHEN 650 MG: 325 SOLUTION ORAL at 18:30

## 2022-12-21 RX ADMIN — SCOPALAMINE 1 PATCH: 1 PATCH, EXTENDED RELEASE TRANSDERMAL at 06:43

## 2022-12-21 RX ADMIN — AMPICILLIN SODIUM AND SULBACTAM SODIUM 3 G: 2; 1 INJECTION, POWDER, FOR SOLUTION INTRAMUSCULAR; INTRAVENOUS at 18:31

## 2022-12-21 RX ADMIN — OXYMETAZOLINE HYDROCHLORIDE 2 SPRAY: 0.05 SPRAY NASAL at 20:36

## 2022-12-21 RX ADMIN — DEXAMETHASONE SODIUM PHOSPHATE 10 MG: 4 INJECTION, SOLUTION INTRA-ARTICULAR; INTRALESIONAL; INTRAMUSCULAR; INTRAVENOUS; SOFT TISSUE at 08:00

## 2022-12-21 RX ADMIN — POLYETHYLENE GLYCOL 400 AND PROPYLENE GLYCOL 2 DROP: 4; 3 SOLUTION/ DROPS OPHTHALMIC at 07:53

## 2022-12-21 RX ADMIN — Medication 20 MG: at 10:27

## 2022-12-21 RX ADMIN — PROPOFOL 50 MG: 10 INJECTION, EMULSION INTRAVENOUS at 09:23

## 2022-12-21 RX ADMIN — Medication 20 MG: at 11:16

## 2022-12-21 RX ADMIN — Medication 50 MG: at 07:53

## 2022-12-21 RX ADMIN — Medication 30 MG: at 08:15

## 2022-12-21 RX ADMIN — Medication 2 G: at 08:00

## 2022-12-21 RX ADMIN — Medication 20 MG: at 08:58

## 2022-12-21 RX ADMIN — PROPOFOL 200 MG: 10 INJECTION, EMULSION INTRAVENOUS at 07:52

## 2022-12-21 RX ADMIN — FENTANYL CITRATE 100 MCG: 50 INJECTION, SOLUTION INTRAMUSCULAR; INTRAVENOUS at 07:51

## 2022-12-21 RX ADMIN — ACETAMINOPHEN 975 MG: 325 TABLET, FILM COATED ORAL at 06:42

## 2022-12-21 RX ADMIN — OXYMETAZOLINE HYDROCHLORIDE 3 SPRAY: 0.05 SPRAY NASAL at 07:51

## 2022-12-21 RX ADMIN — ONDANSETRON 4 MG: 2 INJECTION INTRAMUSCULAR; INTRAVENOUS at 08:00

## 2022-12-21 RX ADMIN — Medication 10 MG: at 12:32

## 2022-12-21 RX ADMIN — KETOROLAC TROMETHAMINE 30 MG: 30 INJECTION, SOLUTION INTRAMUSCULAR at 13:10

## 2022-12-21 ASSESSMENT — ACTIVITIES OF DAILY LIVING (ADL)
DIFFICULTY_EATING/SWALLOWING: NO
DOING_ERRANDS_INDEPENDENTLY_DIFFICULTY: NO
CHANGE_IN_FUNCTIONAL_STATUS_SINCE_ONSET_OF_CURRENT_ILLNESS/INJURY: NO
CONCENTRATING,_REMEMBERING_OR_MAKING_DECISIONS_DIFFICULTY: NO
ADLS_ACUITY_SCORE: 35
TOILETING_ISSUES: NO
DRESSING/BATHING_DIFFICULTY: NO
ADLS_ACUITY_SCORE: 24
ADLS_ACUITY_SCORE: 35
WALKING_OR_CLIMBING_STAIRS_DIFFICULTY: NO
ADLS_ACUITY_SCORE: 41
ADLS_ACUITY_SCORE: 35
FALL_HISTORY_WITHIN_LAST_SIX_MONTHS: NO
WEAR_GLASSES_OR_BLIND: NO

## 2022-12-21 NOTE — ANESTHESIA PREPROCEDURE EVALUATION
ORAL & MAXILLOFACIAL SURGERY   HISTORY & PHYSICAL  Kristine Bravo,  MRN: [id number],  : 2000        SEBASTIÁN Carmona is a 22-year-old M who presents to the Oral and Maxillofacial Surgery Department for evaluation of his skeletal-dental deformity. He was referred by his orthodontist, Dr. Sinha for preorthognathic evaluation. The patient voices multiple functional concerns regarding inability to properly bite, chew, and swallow food primarily due to inability to bring his front teeth together and states that a portion of the food is often left behind when attempting to bite into something. Patient reports occasional pain/discomfort with his periauricular area, neck, shoulders, teeth, eyes, and maxillary sinuses. He presents today for pre-operative evaluation.       HISTORY  Past Medical History:   Past Medical History:   Diagnosis Date     Uncomplicated asthma      Past Surgical History: No past surgical history on file.  Medications:   acetaminophen (TYLENOL) solution 650 mg  [COMPLETED] acetaminophen (TYLENOL) tablet 975 mg  [COMPLETED] acetaminophen (TYLENOL) tablet 975 mg  ampicillin-sulbactam (UNASYN) 3 g vial to attach to  mL bag  benzocaine-menthol (CEPACOL) 15-3.6 MG lozenge 1 lozenge  [COMPLETED] ceFAZolin Sodium (ANCEF) injection 2 g  ceFAZolin Sodium (ANCEF) injection 2 g  [COMPLETED] chlorhexidine (PERIDEX) 0.12 % solution 10 mL  [COMPLETED] chlorhexidine (PERIDEX) 0.12 % solution 10 mL  chlorhexidine (PERIDEX) 0.12 % solution 15 mL  [COMPLETED] gabapentin (NEURONTIN) capsule 300 mg  gabapentin (NEURONTIN) solution 100 mg  hydrocortisone (CORTAID) 1 % cream  HYDROmorphone (DILAUDID) injection 0.2-0.4 mg  ketorolac (TORADOL) injection 30 mg  lactated ringers infusion  [COMPLETED] meloxicam (MOBIC) tablet 15 mg  ondansetron (ZOFRAN ODT) ODT tab 4 mg   Or  ondansetron (ZOFRAN) injection 4 mg  oxyCODONE (ROXICODONE) solution 5-10 mg  oxymetazoline (AFRIN) 0.05 % spray 2 spray  prochlorperazine  (COMPAZINE) injection 10 mg   Or  prochlorperazine (COMPAZINE) tablet 10 mg   Or  prochlorperazine (COMPAZINE) suppository 25 mg  pseudoePHEDrine (SUDAFED) tablet 60 mg  scopolamine (TRANSDERM) 72 hr patch 1 patch  scopolamine (TRANSDERM-SCOP) Patch in Place  sennosides (SENOKOT) syrup 5 mL  sodium chloride (OCEAN) 0.65 % nasal spray 2 spray  [COMPLETED] tranexamic acid 1 g in 100 mL NS IV bag (premix)  White Petrolatum GEL    albuterol (PROVENTIL) (2.5 MG/3ML) 0.083% neb solution, Inhale 2.5 mg into the lungs  albuterol (VENTOLIN HFA) 108 (90 BASE) MCG/ACT Inhaler, Inhale 1-2 puffs into the lungs every 4 hours as needed for shortness of breath / dyspnea or wheezing  cetirizine (ZYRTEC) 10 MG tablet, Take 10 mg by mouth daily  finasteride (PROPECIA) 1 MG tablet, Take 1 tablet by mouth daily  fluticasone (FLONASE) 50 MCG/ACT nasal spray,   HYDROcodone-acetaminophen (NORCO) 5-325 MG per tablet, Take 1-2 tablets by mouth every 4 hours as needed for moderate to severe pain  ibuprofen (ADVIL/MOTRIN) 800 MG tablet, Take 1 tablet (800 mg) by mouth every 8 hours as needed for moderate pain  loratadine (CLARITIN) 10 MG tablet, Take 10 mg by mouth every 24 hours  montelukast (SINGULAIR) 10 MG tablet, Take 10 mg by mouth  NALTREXONE HCL PO, 4.5 mg by Other route daily  phentermine (ADIPEX-P) 37.5 MG capsule, Take 37.5 mg by mouth every morning  predniSONE (DELTASONE) 20 MG tablet, Take two tablets (= 40mg) each day for 5 (five) days     Patient reports he is no longer taking phentermine, and has stopped since 11/9/2022 due to delay of surgery. He reports he is now taking [id number] injections for weight loss management as well as Naltrexone for weight management and not as an opioid antagonist.  He reports that he is still taking cetrirzine and finasteride. Denies taking other medications at this time.     Allergies: No Known Allergies  Social History:   Social History     Socioeconomic History     Marital status: Single      "Spouse name: Not on file     Number of children: Not on file     Years of education: Not on file     Highest education level: Not on file   Occupational History     Not on file   Tobacco Use     Smoking status: Never     Smokeless tobacco: Not on file   Substance and Sexual Activity     Alcohol use: No     Drug use: No     Sexual activity: Yes   Other Topics Concern     Not on file   Social History Narrative     Not on file     Social Determinants of Health     Financial Resource Strain: Not on file   Food Insecurity: Not on file   Transportation Needs: Not on file   Physical Activity: Not on file   Stress: Not on file   Social Connections: Not on file   Intimate Partner Violence: Not on file   Housing Stability: Not on file       REVIEW OF SYSTEMS  ROS reviewed and negative aside from listed in HPI    PHYSICAL EXAM  Vital Signs:   BP: 121/76  P:59  SpO2:99%  Height 6'2\"  Weight 220lbs    GEN: WD/WN male, NAD  HEENT Normocephalic, atraumatic, EOMI, PERRL no extraoral masses or lesion. No clicking/popping/crepitus of the bilateral temporomandibular joints, no preauricular tenderness noted, myalgia   Mallampati I  Otto classification I  Oral exam: ALBERTO 52 mm. No lesions of the maxillary or mandibular vestibules/buccal mucosa. Mucosa of the hard/soft palate, tongue, floor of mouth, posterior oropharynx pink and without masses/lesions. Floor of mouth soft, non-tender, non-distended. Uvula midline.   CV: RRR, no M/G/R, warm and well-perfused  PULM: CTAB, breathing comfortably on room air  GI: Soft, ND/NT  MSK: BOSTON, no peripheral extremity edema  NEURO: AAOx4, CN II-XII intact bilaterally  PSYCH: Appropriate mood and affect            IMAGING RESULTS (Include outside hospital results)  EXAMINATION: CBCT scan of both the arches.    IMAGE QUALITY: Optimal for diagnosis.     IMAGING INDICATIONS:  To evaluate maxillofacial structures in preparation for orthognathic surgery.     PREVIOUS STUDIES: Previous CBCT scans acquired " 2022 and 2022 are available for comparison.     DENTITION:  The permanent maxillary lateral incisors appear missing. The primary maxillary canines appear retained. This accounting is different from previously reported.     DENTAL FINDINGS:   - Canine to canine anterior open bite.   - There is advanced apical root resorption of the retained primary maxillary canines.      OSSEOUS FINDINGS: Visible bony findings appear normal.      SINUSES and NASAL FOSSA: Visible sinonasal findings appear normal.      TMJs: Both condylar heads demonstrate slight flattening consistent with remodeling. The findings appear similar to that noted previously.      OTHER FINDINGS: Other facial bones, where visualized, appear normal.     RADIOGRAPHIC IMPRESSIONS:   1. Canine to canine anterior open bite.  2. 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  3. TMJ remodeling. This finding is considered within normal limits in the absence of TMJ symptoms.     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     PLAN  - OR for Lefort I and BSSO on 22  - Patient is to stop taking his Naltrexone and [id number] 7 DAYS prior to surgery.   - Continue taking finasteride and cetrizine   - VSP plan given to patient      Discussed with chief resident and staff.    Yana Calhoun DDS  Oral & Maxillofacial Surgery, PGY-4  Pager: [phone] Pre-Procedure Evaluation    Patient: Kristine Bravo   MRN: [id number] : 2000        Procedure : Procedure(s):  OSTEOTOMY, LE FORT I, WITH MANDIBULAR SAGITTAL SPLIT          Past Medical History:   Diagnosis Date     Uncomplicated asthma       History reviewed. No pertinent surgical history.   No Known Allergies   Social History     Tobacco Use     Smoking status: Never     Smokeless tobacco: Not on  file   Substance Use Topics     Alcohol use: No      Wt Readings from Last 1 Encounters:   12/21/22 111.7 kg (246 lb 4.1 oz)        Anesthesia Evaluation            ROS/MED HX  ENT/Pulmonary: Comment: Asymptomatic asthma, likely has outgrown it.    (+) asthma Treatment: Inhaler prn,      Neurologic:       Cardiovascular:       METS/Exercise Tolerance:     Hematologic:       Musculoskeletal:       GI/Hepatic:       Renal/Genitourinary:       Endo:       Psychiatric/Substance Use:       Infectious Disease:       Malignancy:       Other:            Physical Exam    Airway        Mallampati: I   TM distance: > 3 FB   Neck ROM: full   Mouth opening: > 3 cm    Respiratory Devices and Support         Dental  no notable dental history     (+) lower braces and upper braces      Cardiovascular          Rhythm and rate: regular and normal     Pulmonary           breath sounds clear to auscultation           OUTSIDE LABS:  CBC: No results found for: WBC, HGB, HCT, PLT  BMP:   Lab Results   Component Value Date     (H) 12/21/2022     COAGS: No results found for: PTT, INR, FIBR  POC: No results found for: BGM, HCG, HCGS  HEPATIC: No results found for: ALBUMIN, PROTTOTAL, ALT, AST, GGT, ALKPHOS, BILITOTAL, BILIDIRECT, SHLOMO  OTHER: No results found for: PH, LACT, A1C, PRIMITIVO, PHOS, MAG, LIPASE, AMYLASE, TSH, T4, T3, CRP, SED    Anesthesia Plan    ASA Status:  2      Anesthesia Type: General.     - Airway: ETT   Induction: Intravenous.   Maintenance: Balanced.   Techniques and Equipment:     - Airway: Nasal FAISAL     - Lines/Monitors: 2nd IV     Consents    Anesthesia Plan(s) and associated risks, benefits, and realistic alternatives discussed. Questions answered and patient/representative(s) expressed understanding.    - Discussed:     - Discussed with:  Patient      - Extended Intubation/Ventilatory Support Discussed: No.      - Patient is DNR/DNI Status: No    Use of blood products discussed: No .     Postoperative  Care    Pain management: IV analgesics.   PONV prophylaxis: Ondansetron (or other 5HT-3), Dexamethasone or Solumedrol, Scopolamine patch     Comments:    Other Comments: NPO. Great airway. No asthma symptoms for a long time, likely has outgrown it.       H&P reviewed: Unable to attach H&P to encounter due to EHR limitations. H&P Update: appropriate H&P reviewed, patient examined. No interval changes since H&P (within 30 days).         Giuliana Black MD

## 2022-12-21 NOTE — ANESTHESIA PROCEDURE NOTES
Airway       Patient location during procedure: OR       Procedure Start/Stop Times: 12/21/2022 7:55 AM  Staff -        Anesthesiologist:  Giuliana Black MD       CRNA: Paige Bullock APRN CRNA       Performed By: CRNA  Consent for Airway        Urgency: elective  Indications and Patient Condition       Indications for airway management: aditya-procedural       Induction type:intravenous       Mask difficulty assessment: 1 - vent by mask    Final Airway Details       Final airway type: endotracheal airway       Successful airway: ETT - single, Nasal and FAISAL (inserted into NP by Dr. Black)  Endotracheal Airway Details        ETT size (mm): 8.0 (dilations x2 with nasal trumpets, H2O sol lube and Afrin spray before ETT placed)       Cuffed: yes       Successful intubation technique: direct laryngoscopy       DL Blade Type: Ball 2       Grade View of Cords: 1 (clear and open)       Adjucts: other (comment) (none necessary)       Position: Left       Measured from: nares       Bite block used: None    Post intubation assessment        Placement verified by: capnometry, equal breath sounds and chest rise        Number of attempts at approach: 1       Number of other approaches attempted: 0       Secured with: sutures (by surgical  residents)       Ease of procedure: easy       Dentition: Intact and Unchanged    Medication(s) Administered   Medication Administration Time: 12/21/2022 7:55 AM

## 2022-12-21 NOTE — ANESTHESIA CARE TRANSFER NOTE
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    Anesthesia Type:   No value filed.     Note:    Oropharynx: oropharynx clear of all foreign objects, spontaneously breathing and nasal airway in place  Level of Consciousness: drowsy  Oxygen Supplementation: face mask  Level of Supplemental Oxygen (L/min / FiO2): 10  Independent Airway: airway patency satisfactory and stable  Dentition: dentition unchanged  Vital Signs Stable: post-procedure vital signs reviewed and stable  Report to RN Given: handoff report given  Patient transferred to: PACU    Handoff Report: Identifed the Patient, Identified the Reponsible Provider, Reviewed the pertinent medical history, Discussed the surgical course, Reviewed Intra-OP anesthesia mangement and issues during anesthesia, Set expectations for post-procedure period and Allowed opportunity for questions and acknowledgement of understanding      Vitals:  Vitals Value Taken Time   /85 12/21/22 1430   Temp     Pulse 104 12/21/22 1431   Resp 12 12/21/22 1431   SpO2 96 % 12/21/22 1431   Vitals shown include unvalidated device data.    Electronically Signed By: GERRY Jesus CRNA  December 21, 2022  2:33 PM

## 2022-12-21 NOTE — BRIEF OP NOTE
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  Complications: None.  Implants:   Implant Name Type Inv. Item Serial No.  Lot No. LRB No. Used Action   IMP SCR SYN MATRIX 2.1X6MM SELF TAP [phone] - [id number] Metallic Hardware/[place] IMP SCR SYN MATRIX 2.1X6MM SELF TAP [phone]  [employer] N/A N/A 3 Wasted   IMP SCR SYN MATRIX 1.85X6MM SELF DRILL [phone] - [id number] Metallic Hardware/[place] IMP SCR SYN MATRIX 1.85X6MM SELF DRILL [phone]  SY[employer]AT[employer] N/A N/A 14 Implanted   IMP SCR SYN MATRIX 1.85X6MM SELF DRILL [phone] - [id number] Metallic Hardware/[place] IMP SCR SYN MATRIX 1.85X6MM SELF DRILL [phone]  SY[employer] N/A N/A 4 Wasted   [employer] K[employer] MAXILLARY - [id number] Metallic Hardware/[place] [employer] MAXILLARY  SYN[employer]ES-STRATEC N/A N/A 1 Implanted   IMP SCR SYN MATRIX COARSE PITCH 1.47G17NP ST [phone] - [id number] Metallic Hardware/[place] IMP SCR SYN MATRIX COARSE PITCH [id number] ST [phone]  [employer] NA N/A 5 Implanted   IMP SCR SYN MATRIX COARSE PITCH 1.02U34UT ST [id number] - [id number] Metallic Hardware/[place] IMP SCR SYN MATRIX COARSE PITCH 1.31K37TU ST [phone]  SY[employer] NA N/A 1 Wasted   IMP SCR SYN MATRIX COARSE PITCH 1.80B95LP ST [phone] - [id number] Metallic Hardware/[place] IMP SCR SYN MATRIX COARSE PITCH 1.83A57TM ST [phone]   [employer] NA N/A 1 Implanted   IMP SCR SYN MATRIX COARSE PITCH 1.69J80XA ST [phone] - [id number] Metallic Hardware/[place] IMP SCR SYN MATRIX COARSE PITCH 1.51M04WN ST [phone]  [employer] NA N/A 1 Wasted

## 2022-12-21 NOTE — OP NOTE
[facility] [place]    DATE OF SERVICE: 12/21/22    PRE-OPERATIVE DIAGNOSIS: Apertognathia    POST-OPERATIVE DIAGNOSIS: Same    PROCEDURE: [place] I osteotomy, bilateral sagittal split mandibular osteotomy    STAFF SURGEON:  Radha Marrero DDS    RESIDENT SURGEON:   Gary Santillan DDS      ANESTHESIA: General anesthesia via nasal ETT    LOCAL ANESTHESIA: 10 cc of 0.25% Marcaine with 1:200,000 epi    INDICATIONS FOR OPERATION:  Kristine is a 22-year-old M who presented to the Oral and Maxillofacial Surgery Department for evaluation of his skeletal-dental deformity. He was referred by his orthodontist, Dr. Sinha for preorthognathic evaluation. The patient voiced multiple functional concerns regarding inability to properly bite, chew, and swallow food primarily due to inability to bring his front teeth together and states that a portion of the food is often left behind when attempting to bite into something. Patient reports occasional pain/discomfort with his periauricular area, neck, shoulders, teeth, eyes, and maxillary sinuses    Risks and Complications were again discussed with the patient including, but not limited to pain,  swelling, bleeding, infection, bruising, scarring, damage to adjacent teeth and structures, damage to  nerves resulting in temporary and/or permanent numbness of the V2/V3 nerve distributions (tongue, lips,  cheeks, and chin), facial paralysis (temporary/permanent), retained tooth fragments, irregular alveolar  bone, failure of wounds to heal, failure of treatment, failure of hardware, need for additional  treatment/procedures, failure to resolve the chief complaint, and any other unforseen complications.    In the preop holding area patient was greeted by the OMS team.  Risks of the procedure discussed as  noted above. Patient elected to proceed.  The patient  was prepped in the customary fashion for Oral & Maxillofacial Surgery Procedures. Throat pack placed. Oral  prep performed.  Local anesthesia administered. Extraoral prep performed. Surgeon's then scrubbed and returned to don sterile gloves and gown. Patient was draped in a sterile fashion.  Time out performed according to [facility] protocol.     External reference marks recorded from central incisor and canine orthodontic brackets to the medial  canthi bilaterally. Attention was directed intraorally to the right maxilla.  Bovie electrocautery used to create  incision from the midline maxillary vestibule, extending posteriorly to approximately 1st molar region.   Subperiosteal dissection performed to expose the infraorbital neurovascular bundle, the pyriform rim,  zygomaticomaxillary buttress, extending posteriorly to the pterygomaxillary junction.  Incision and dissection of the left maxilla was performed in identical fashion.  Nasal  mucosa was then dissected using curved [place].    Applied patient specific cut guide with four 6 mm Syntes screws and was noted to be stable.  Utilized  patient fabricated drill guide to predrill all maxillary screw holes with depth controlled bur.  Sonopet used to create maxillary osteotomy utilizing patient specific cut guide.  Sonopet  used to create  osteotomy from the maxillary tuberosity, extending anteriorly through the pyriform rim, ensuring at least  5mm above the tooth roots and protecting the nasal mucosa. Removed patient  specific Synthes cut guide.  Completed maxillary osteotomy with Sonopet . Guarded double ball nasal osteotome was used to separate the nasal septum from  the maxilla.  Single ball guarded osteotome was used to separate the lateral nasal walls from the maxilla.  Curved osteotome used to create pterygomaxillary dysjunction bilaterally.  Downfracture  completed using manual digital pressure, and mobilized with Forrest disimpaction forceps. Small bilateral  nasal mucosal tear was fixed with 3-0 Chromic Gut suture.  Synthes patient specific right  maxillary and left  maxillary plates were applied and noted to fit well with screws aligning  passively.  Applied 5 mm Synthes screws securing patient specific hardware.  Applied intermediate splint:  Mandibular teeth noted to interdigitate well passively.  Remaining maxillary screws applied, Surgifoam  placed in bilateral maxillary sinuses, maxillary osteotomy noted to be hemostatic prior to closure,  copious irrigation applied to surgical field. Alar cinch suture with 2-0 PDS suture.  A V-Y closure was used to lengthen the upper lip by 1cm.  Bilateral vestibular closure using 3-0 chromic gut in running fashion.      Attention directed to the right mandible.  Bovie electrocautery used to create incision along the ascending ramus, beginning mid occlusal plane extending inferiorly along the external oblique ridge to  approximately 1st molar region.   Subperiosteal dissection performed using #9 molt.  Coronoid process exposed, lateral aspect of  mandible exposed.  Nerve hook used to identify the mandibular foramen medially.  nerve was protected  with a retractor and subperiosteal dissection performed medially.  With handpiece and Cornelia bur made medial mandibular osteotomy, with the inferior alveolar nerve protected, the Sonopet  used to  create osteotomy through the medial aspect of the ramus, extending inferiorly along the ascending  ramus.  With handpiece and Cornelia bur made vertical sagittal cut between the first and second  molar.  Straight osteotome and mallet used to begin the fracture, followed by a wood-handled ostetome,  and finally the smith  to complete the sagittal split.  The inferior alveolar nerve was noted to be  completely encased in the distal segment bilaterally.  Proximal segment medial bone smoothed with bone file.  The site was then packed.  Attention was then directed to the left mandible, where incision  and sagittal split were performed in identical fashion.  Final splint secured to maxillary  dentition using 24-gauge wires x4. Maxillomandibular fixation using orthodontic powerchain. Made stab incisions bilaterally through skin, placed trocar, utilized three Synthes positional screws bilaterally between the proximal and distal mandible.  Maxillomandibular fixation was  released.  Occlusion checked and determined consistent with preoperative planning. The sites were  thoroughly irrigated.  Incisions were closed using 3-0 chromic gut sutures interrupted and running.  Placed 5-0 fast gut interrupted sutures on skin trocar sites.  Placed Merocel packing in the rightnaris due to persistent slow bleed.  Hemostasis  achieved.     Needle and sponge counts correct (x2). Throat pack removed. OG tube passed and removed. The  patient was turned over to the Anesthesia Service, had a smooth emergence from anesthesia, and was extubated.      Following extubation it was noted that the patient was developing a hematoma in the right malar region, the site was compressed with blood visualized draining into the oral cavity and suctioned. Foam tape was then applied for continued compression of the site. He was then turned back over to the anesthesia service and rolled to PACU    SPECIMENS: none    Estimated blood loss: 75    Implants  Implant Name Type Inv. Item Serial No.  Lot No. LRB No. Used Action   IMP SCR SYN MATRIX 2.1X6MM SELF TAP 04.511.236.01 - [id number] Metallic Hardware/Whick IMP SCR SYN MATRIX 2.1X6MM SELF TAP [id number]   Ancera-STRATEC N/A N/A 3 Wasted   IMP SCR SYN MATRIX 1.85X6MM SELF DRILL [id number] - [id number] Metallic Hardware/Whick IMP SCR SYN MATRIX 1.85X6MM SELF DRILL [id number]   SYNTHES-STRATEC N/A N/A 14 Implanted   IMP SCR SYN MATRIX 1.85X6MM SELF DRILL 04.511.226.01 - [id number] Metallic Hardware/Whick IMP SCR SYN MATRIX 1.85X6MM SELF DRILL [id number]   SYNTHES-STRATEC N/A N/A 4 Wasted   TRUMATCH ORTHOGNATHICS KIT BASIC MAXILLARY - [id number] Metallic Hardware/Whick  TRUMATCH ORTHOGNATH[employer] KIT BASIC MAXILLARY   SYNTHES-STRATEC N/A N/A 1 Implanted   IMP SCR SYN MATRIX COARSE PITCH [id number] ST [id number] - [id number] Metallic Hardware/[place]out IMP SCR SYN MATRIX COARSE PITCH [id number] ST [id number]   SYNTHES-STRATEC NA N/A 5 Implanted   IMP SCR SYN MATRIX COARSE PITCH [id number] ST [id number] - [id number] Metallic Hardware/Lookout IMP SCR SYN MATRIX COARSE PITCH [id number] ST [id number]   SYNTHES-STRATEC NA N/A 1 Wasted   IMP SCR SYN MATRIX COARSE PITCH [id number] ST 04.511.216.01 - [id number] Metallic Hardware/Lookout IMP SCR SYN MATRIX COARSE PITCH [id number] ST [id number]   SYNTHES-STRATEC NA N/A 1 Implanted   IMP SCR SYN MATRIX COARSE PITCH 1.21X54ND ST [id number] - [id number] Metallic Hardware/Lookout IMP SCR SYN MATRIX COARSE PITCH [id number] ST [id number]   SYNTHES-STRATEC NA N/A 1 Wasted         Staff surgeon was present for the entirety of the procedure.    Gary Montalvo DDS   OMFS PGY3    I was present for the procedure from open to close and scrubbed in for all critical portions and agree with the note as documented.     Anne Marrero DDS

## 2022-12-21 NOTE — ANESTHESIA POSTPROCEDURE EVALUATION
Patient: Kristine Bravo    Procedure: Procedure(s):  OSTEOTOMY, LE FOR[place] I, WITH MANDIBULAR SAGITTAL SPLIT       Anesthesia Type:  No value filed.    Note:  Disposition: Inpatient   Postop Pain Control: Uneventful            Sign Out: Well controlled pain   PONV: No   Neuro/Psych: Uneventful            Sign Out: Acceptable/Baseline neuro status   Airway/Respiratory: Uneventful            Sign Out: Acceptable/Baseline resp. status   CV/Hemodynamics: Uneventful            Sign Out: Acceptable CV status; No obvious hypovolemia; No obvious fluid overload   Other NRE: NONE   DID A NON-ROUTINE EVENT OCCUR? No           Last vitals:  Vitals Value Taken Time   /80 12/21/22 1500   Temp 37.8  C (100  F) 12/21/22 1430   Pulse 108 12/21/22 1514   Resp 18 12/21/22 1514   SpO2 94 % 12/21/22 1514   Vitals shown include unvalidated device data.    Electronically Signed By: Giuliana Black MD  December 21, 2022  3:15 PM

## 2022-12-22 VITALS
HEIGHT: 75 IN | WEIGHT: 246.25 LBS | TEMPERATURE: 95.8 F | BODY MASS INDEX: 30.62 KG/M2 | SYSTOLIC BLOOD PRESSURE: 136 MMHG | HEART RATE: 75 BPM | RESPIRATION RATE: 16 BRPM | DIASTOLIC BLOOD PRESSURE: 55 MMHG | OXYGEN SATURATION: 100 %

## 2022-12-22 LAB
CREAT SERPL-MCNC: 0.73 MG/DL (ref 0.67–1.17)
GFR SERPL CREATININE-BSD FRML MDRD: >90 ML/MIN/1.73M2
GLUCOSE BLDC GLUCOMTR-MCNC: 118 MG/DL (ref 70–99)
HOLD SPECIMEN: NORMAL

## 2022-12-22 PROCEDURE — 82565 ASSAY OF CREATININE: CPT

## 2022-12-22 PROCEDURE — 36415 COLL VENOUS BLD VENIPUNCTURE: CPT

## 2022-12-22 PROCEDURE — 258N000003 HC RX IP 258 OP 636: Performed by: DENTIST

## 2022-12-22 PROCEDURE — 250N000013 HC RX MED GY IP 250 OP 250 PS 637: Performed by: DENTIST

## 2022-12-22 PROCEDURE — 250N000011 HC RX IP 250 OP 636: Performed by: DENTIST

## 2022-12-22 RX ORDER — ALBUTEROL SULFATE 90 UG/1
2 AEROSOL, METERED RESPIRATORY (INHALATION) EVERY 6 HOURS PRN
COMMUNITY

## 2022-12-22 RX ORDER — IBUPROFEN 100 MG/5ML
600 SUSPENSION, ORAL (FINAL DOSE FORM) ORAL EVERY 6 HOURS PRN
Qty: 946 ML | Refills: 0 | Status: SHIPPED | OUTPATIENT
Start: 2022-12-22 | End: 2022-12-29

## 2022-12-22 RX ORDER — OXYCODONE HCL 5 MG/5 ML
5 SOLUTION, ORAL ORAL EVERY 6 HOURS PRN
Qty: 80 ML | Refills: 0 | Status: SHIPPED | OUTPATIENT
Start: 2022-12-22 | End: 2022-12-26

## 2022-12-22 RX ORDER — AMOXICILLIN AND CLAVULANATE POTASSIUM 400; 57 MG/5ML; MG/5ML
800 POWDER, FOR SUSPENSION ORAL 2 TIMES DAILY
Qty: 140 ML | Refills: 0 | Status: SHIPPED | OUTPATIENT
Start: 2022-12-22

## 2022-12-22 RX ORDER — IBUPROFEN 200 MG
200 TABLET ORAL EVERY 6 HOURS PRN
COMMUNITY

## 2022-12-22 RX ORDER — ACETAMINOPHEN 160 MG/5ML
640 LIQUID ORAL EVERY 6 HOURS PRN
Qty: 946 ML | Refills: 0 | Status: SHIPPED | OUTPATIENT
Start: 2022-12-22 | End: 2023-01-03

## 2022-12-22 RX ORDER — LORATADINE 10 MG/1
10 CAPSULE, LIQUID FILLED ORAL DAILY
COMMUNITY

## 2022-12-22 RX ORDER — CHLORHEXIDINE GLUCONATE ORAL RINSE 1.2 MG/ML
15 SOLUTION DENTAL 2 TIMES DAILY
Qty: 473 ML | Refills: 2 | Status: SHIPPED | OUTPATIENT
Start: 2022-12-22

## 2022-12-22 RX ADMIN — OXYCODONE HYDROCHLORIDE 5 MG: 5 SOLUTION ORAL at 01:39

## 2022-12-22 RX ADMIN — OXYCODONE HYDROCHLORIDE 5 MG: 5 SOLUTION ORAL at 04:06

## 2022-12-22 RX ADMIN — ACETAMINOPHEN 650 MG: 325 SOLUTION ORAL at 00:54

## 2022-12-22 RX ADMIN — SALINE NASAL SPRAY 2 SPRAY: 1.5 SOLUTION NASAL at 01:44

## 2022-12-22 RX ADMIN — SODIUM CHLORIDE, POTASSIUM CHLORIDE, SODIUM LACTATE AND CALCIUM CHLORIDE: 600; 310; 30; 20 INJECTION, SOLUTION INTRAVENOUS at 07:38

## 2022-12-22 RX ADMIN — AMPICILLIN SODIUM AND SULBACTAM SODIUM 3 G: 2; 1 INJECTION, POWDER, FOR SOLUTION INTRAMUSCULAR; INTRAVENOUS at 00:54

## 2022-12-22 RX ADMIN — PSEUDOEPHEDRINE HYDROCHLORIDE 60 MG: 60 TABLET ORAL at 06:41

## 2022-12-22 RX ADMIN — GABAPENTIN 100 MG: 250 SOLUTION ORAL at 02:16

## 2022-12-22 RX ADMIN — GABAPENTIN 100 MG: 250 SOLUTION ORAL at 10:43

## 2022-12-22 RX ADMIN — OXYCODONE HYDROCHLORIDE 5 MG: 5 SOLUTION ORAL at 07:45

## 2022-12-22 RX ADMIN — PSEUDOEPHEDRINE HYDROCHLORIDE 60 MG: 60 TABLET ORAL at 00:54

## 2022-12-22 RX ADMIN — AMPICILLIN SODIUM AND SULBACTAM SODIUM 3 G: 2; 1 INJECTION, POWDER, FOR SOLUTION INTRAMUSCULAR; INTRAVENOUS at 13:10

## 2022-12-22 RX ADMIN — ACETAMINOPHEN 650 MG: 325 SOLUTION ORAL at 13:10

## 2022-12-22 RX ADMIN — OXYMETAZOLINE HYDROCHLORIDE 2 SPRAY: 0.05 SPRAY NASAL at 07:39

## 2022-12-22 RX ADMIN — OXYCODONE HYDROCHLORIDE 5 MG: 5 SOLUTION ORAL at 16:17

## 2022-12-22 RX ADMIN — SALINE NASAL SPRAY 2 SPRAY: 1.5 SOLUTION NASAL at 10:51

## 2022-12-22 RX ADMIN — SALINE NASAL SPRAY 2 SPRAY: 1.5 SOLUTION NASAL at 04:06

## 2022-12-22 RX ADMIN — AMPICILLIN SODIUM AND SULBACTAM SODIUM 3 G: 2; 1 INJECTION, POWDER, FOR SOLUTION INTRAMUSCULAR; INTRAVENOUS at 06:48

## 2022-12-22 RX ADMIN — SALINE NASAL SPRAY 2 SPRAY: 1.5 SOLUTION NASAL at 06:52

## 2022-12-22 RX ADMIN — ACETAMINOPHEN 650 MG: 325 SOLUTION ORAL at 06:41

## 2022-12-22 RX ADMIN — OXYCODONE HYDROCHLORIDE 5 MG: 5 SOLUTION ORAL at 10:43

## 2022-12-22 RX ADMIN — PSEUDOEPHEDRINE HYDROCHLORIDE 60 MG: 60 TABLET ORAL at 13:10

## 2022-12-22 RX ADMIN — CHLORHEXIDINE GLUCONATE 15 ML: 1.2 SOLUTION ORAL at 07:40

## 2022-12-22 ASSESSMENT — ACTIVITIES OF DAILY LIVING (ADL)
ADLS_ACUITY_SCORE: 24

## 2022-12-22 NOTE — PROGRESS NOTES
SPIRITUAL HEALTH SERVICES  SPIRITUAL ASSESSMENT Progress Note  [facility] 6A       REFERRAL SOURCE: Self initiated  visit, Mormonism specific.     DEMOGRAPHIC: Pt Kristine Bravo identifies as Mormonism and is of [place] descent.        ILLNESS CIRCUMSTANCE: I introduced myself to Kristine as the Lead Mormonism  and oriented him to [facility].  Assessed emotional/spiritual needs and resources while offering reflective conversation, which integrated elements of illness and family narratives.     Kristine shared with me that he is feeling congested after his jaw surgery. He communicated with me some verbally and some in text on his phone. Kristine share that his family are on their way to visit with him in the hospital.    SPIRITUAL INTERVENTIONS: I offered Islamic incantations/prayer at bedside. I also provided him with an Islamic prayer booklet for Mormonism patients.    PLAN: I will follow up with Kristine for the duration of his stay. [facility] is available to Kristine per request.     Hansel Carrington  Lead Mormonism   Pager [phone]    [facility] remains available 24/7 for emergent requests/referrals, either by having the switchboard page the on-call  or by entering an ASAP/STAT consult in Epic (this will also page the on-call ).

## 2022-12-22 NOTE — PLAN OF CARE
Status: POD #1 s/p osteotomy [place] I with mandibular sagittal split. Hx maxillary hypoplasia, asthma  Vitals: VSS on RA  Neuros: A&Ox4 with garbled speech and facial swelling. 5/5 t/o  IV: PIV infusing LR @ 100 mL/hr and other PIV SL  Resp/trach: Continuous pulse ox in high 90's on RA. Nasal congestion, PRN ocean spray in use. Packing in R. nare with scant drainage. Mild-moderate amount of bloody oral secretions managed independently with red zenia suction as needed  Diet: Full liquid, good PO. Denies nausea  Bowel status: LBM 12/21. BS+. Passing gas  : Voiding  Skin: Intraoral and facial incisions UTV. Jaw bra in place, replace ice PRN.   Pain: 6-8/10 jaw and headache pain controlled with sched tylenol and PRN oxycodone  Activity: SBA  Plan/updates: Continue POC

## 2022-12-22 NOTE — PROGRESS NOTES
Discharge time/date: 1630 12/22  Walked or Wheelchair: walked  PIV removed: yes  Reviewed AVS with patient: yes  Medication due times added to AVS in EPIC: yes  Verbalized understanding of discharge with teachback: yes  Medications retrieved from pharmacy: yes  Supplies sent home: yes  Belongings from security with patient: N/A

## 2022-12-22 NOTE — PHARMACY-ADMISSION MEDICATION HISTORY
Admission Medication History Completed by Pharmacy    See [facility] Admission Navigator for allergy information, preferred outpatient pharmacy, prior to admission medications and immunization status.     Medication History Sources:     Patient, patient's sister and mother    SureScripts    Changes made to PTA medication list (reason):    Added: [id number] (modified HGH)-unclear dosing     Deleted: -Phentermine, Albuterol nebulizer solution, prednisone Changed: Ibuprofen 200 mg every 6 hrs as needed for pain    Additional Information:    Medication history was unable to be completed with patient. History was completed with patient's mother and sister. They acknowledged patient only currently takes acetaminophen and ibuprofen for pain. Patient takes finasteride for hair growth and takes several OTC medications for allergies including both cetirizine and loratadine. Patient has also reported use of modified HGH as well as naltrexone for weight loss. Patient does have an albuterol inhaler at home as reported by family.     Prior to Admission medications    Medication Sig Last Dose Taking? Auth Provider Long Term End Date   acetaminophen (TYLENOL) 160 MG/5ML solution Take 20 mLs (640 mg) by mouth every 6 hours as needed for fever or mild pain  Yes Gary Montalvo DDS  1/3/23   albuterol (PROAIR HFA/PROVENTIL HFA/VENTOLIN HFA) 108 (90 Base) MCG/ACT inhaler Inhale 2 puffs into the lungs every 6 hours as needed for shortness of breath, wheezing or cough More than a month Yes Unknown, Entered By History No    amoxicillin-clavulanate (AUGMENTIN) 400-57 MG/5ML suspension Take 10 mLs (800 mg) by mouth 2 times daily  Yes Gary Montalvo DDS     cetirizine (ZYRTEC) 10 MG tablet Take 10 mg by mouth daily 12/21/2022 at 0500 Yes Reported, Patient     finasteride (PROPECIA) 1 MG tablet Take 1 tablet by mouth daily Past Week Yes Reported, Patient     fluticasone (FLONASE) 50 MCG/ACT nasal spray  Past Month Yes Reported, Patient     ibuprofen  (ADVIL/MOTRIN) 100 MG/5ML suspension Take 30 mLs (600 mg) by mouth every 6 hours as needed for fever or moderate pain (4-6)  Yes Gary Montalvo DDS No 12/29/22   loratadine 10 MG capsule Take 10 mg by mouth daily Past Week Yes Unknown, Entered By History No    NALTREXONE HCL PO 4.5 mg by Other route daily Past Week Yes Reported, Patient Yes    NONFORMULARY [id number] (modified HGH). Unclear what dosage he takes Past Week Yes Unknown, Entered By History     oxyCODONE (ROXICODONE) 5 MG/5ML solution Take 5 mLs (5 mg) by mouth every 6 hours as needed for severe pain (7-10)  Yes Gary Montalvo DDS  12/26/22       Date completed: 12/22/22    Medication history completed by: Emiliano Suarez, PharmD

## 2022-12-22 NOTE — PLAN OF CARE
Status: POD #1 s/p osteotomy [place] I with mandibular sagittal split. Hx maxillary hypoplasia, asthma  Vitals: VSS on RA  Neuros: garbled speech d/t facial swelling, otherwise intact  IV: PIV running LR  Diet: full liquid- discharging with no chew  Bowel status: BS+ LBM prior to surgery  : voiding spontaneously  Skin: pressure dressing to face, jaw bra still on  Pain: tylenol, oxy, gabapentin  Activity: independent  Plan: discharge today

## 2022-12-22 NOTE — PROGRESS NOTES
ORAL & MAXILLOFACIAL SURGERY   PROGRESS NOTE  Kristine Bravo,  MRN: [id number],  : 2000           ASSESSMENT:  Kristine Bravo is a 22 year old male POD1 s/p [place] 1 osteotomy of the maxilla, BSSO of the mandible on 22.    PLAN:  Diet:   - Plan to advance to full liquid diet. Need good calorie intake prior to discharge home  Activity:   - encourage continued ambulation in the halls with nursing assistance today   HEENT:  - Jaw bra to be maintained (nursing to change ice packs)  - strict sinus precautions (no blowing nose, sneeze with mouth open, no straws, no spitting)  - continue to monitor nose bleeding  - HOB >45  - elastics placed this AM  Antibiotics:  - peridex mouth rinse BID  Pain control:   - tylenol, ibuprofen  - oxycodone     IVF:  > bolus, then dc     Discussed with PGY-3 (Dr. Gary Montalvo) and staff surgeon Dr. Marrero.    Marty Soares DDS  Oral & Maxillofacial Surgery Intern      Please contact the OMFS resident on-call with questions or concerns.  ____________________________________      SUBJECTIVE:  22 year old male POD1 s/p [place] 1 osteotomy of the maxilla BSSO of the mandible on 22. Patient resting comfortably in bed on encounter this AM. No acute events overnight. He has been tolerating clear liquid diet very well and pain is well managed with tylenol, ibuprofen and oxycodone. Patient states he has been ambulating and voiding since procedure yesterday. Rates his jaw pain a 5/10 this AM. Denies fever, chills, nausea or vomiting during encounter.      PHYSICAL EXAM:   Vitals:    22 0053 22 0406 22 0451 22 0732   BP:    137/64   BP Location:    Left arm   Pulse: 74 75 66 74   Resp:    16   Temp:  (!) 96.6  F (35.9  C)  96.9  F (36.1  C)   TempSrc:  Axillary  Axillary   SpO2: 98% 99% 99% 100%   Weight:       Height:          GEN: WD/WN female, NAD  HEENT: NC/AT, EOMI, PERRL, bilateral facial swelling, lower lip edema consistent with post surgical  changes, dried epistaxis of nares bilaterally. Dressing pack intact in right nare with no drainage present. Secured by tape. Will plan for removal. Maxilla is well perfused, not fully into occlusion with the surgical splint, elastics placed this morning bilateral canines, incisions clean and intact, no wound dehiscence.   CV: RRR  PULM: CTAB, breathing comfortably on room air  MSK: [place], no peripheral extremity edema  NEURO: AAOx4, CN II-XII intact bilaterally  PSYCH: Appropriate mood and affect     LABS:   CBC RESULTS: No results for input(s): WBC, RBC, HGB, HCT, MCV, MCH, MCHC, RDW, PLT in the last 71079 hours.   Last Comprehensive Metabolic Panel:  GLUCOSE BY METER POCT   Date Value Ref Range Status   12/22/2022 118 (H) 70 - 99 mg/dL Final        RADIOLOGY:  N/A

## 2022-12-22 NOTE — DISCHARGE INSTRUCTIONS
POSTSURGICAL INSTRUCTION    VISITORS   It has been our experience that the evening after surgery was a time when visitors are best kept to a minimum. It is encouraged that you have very few visitors and they be limited to the immediate members of the family and/or very close friends.     WOUNDS  The wounds in your mouth and skin should be left alone and undisturbed for the first 24 hours after surgery. You may begin rinsing your mouth on the second day after surgery. A prescription mouth rinse should be used and will be prescribed by your doctor.  Avoid probing the wound with anything and do not use a water-pick during your healing course. Please begin gentle brushing the day after surgery if possible. Keeping the oral cavity and teeth clean will help reduce the risk of infection. Trauma to incisions can result loosening of sutures and opening of wounds. If you smoke please refrain for as long as possible, up to a week (or forever) is beneficial to healing.    NAUSEA AND VOMITING   You may experience some nausea or vomiting after surgery.  It is important to realize that this is not a life threatening situation since your stomach is empty.  If you have had some liquids, remember anything hat went in through the teeth can come out through the teeth if you are wired shut.    If vomiting does occur; (1) remain calm; (2) call the nurse so he/she may assist you; (3) turn on your side or sit up and lean forward so that any fluid produced can be emptied from your mouth. Most patients will not be wired shut but there are some exceptions. You will also be taught how to use a suction device to clear your mouth.  This will be available to you in both the recovery room and in your room when you return to it after surgery. The nurses who care for you are used to dealing with patients who have their jaws with elastics holding them together.  Scissors will be available to these nurses on your stout, even though it is very unusual  to have to cut the elastics that hold your jaws in position.    SWELLING  Swelling occurs after all surgery.  The degree of swelling is quite variable in different individuals.  More swelling usually occurs with the lower jaw surgery.  Swelling will continue to increase for approximately 48 to 72 hours following surgery, and then will resolve within 10 days to 2 weeks. We try to minimize your swelling with a medication but some swelling should be anticipated.  Also, ice packs may be applied to your face for 24-48 hours. You can reduce swelling by keeping your head elevated for the first week following surgery and by walking as soon as possible after surgery.  The ointment at the bedside should be used to keep your lips moist.    FOLLOWING SURGERY    BLEEDING/NUMBNESS  It is common to experience minor bleeding both from the nose and mouth following surgery.  This generally stops at 24 to 48 hours but may continue for 7-10 days after surgery. Both your upper and lower lips will be numb from the surgery and may stay that way for a few months. Most patients recover all sensation to these areas but about 10-20% of patients will have some permanent lip, chin and teeth numbness following surgery.     NASAL STUFFINESS AND SORE THROAT  Nasal stuffiness and a sore throat can occur, both from the tubes placed during surgery and from surgery on the upper jaw.  When stuffiness occurs, it can be managed with a combination of cleansing of the nostrils and nasal sprays.  Nasal secretions can be removed by using cotton-tipped swabs soaked in a solution of hydrogen peroxide and water (one to three parts).    When it is necessary to use a decongestant nasal spray, squeeze the spray bottle with sufficient force for you to taste the medication.  This will provide relief in approximately 3 to 5 minutes, but nasal decongestant sprays should not be used for more than 4 days.  The nasal stuffiness will resolve within approximately 1-2 weeks  "following surgery.  Your sore throat should improve by the third or fourth day following surgery. If your throat is still bothering you more than a week after surgery please make us aware of this.     HYDRATION  It will be important that you drink a sufficient volume of fluids to keep yourself hydrated  An average adult requires 2 to 3 quarts of fluid every 24 hours.  While this may seem like a large quantity, it can be best achieved with constant sipping.  Most patients drink directly from a cup or glass using a straw.  However, for those who find this difficult a large catheter tipped syringe will be available to assist you in taking fluids.    ELASTICS AFTER SURGERY  In select cases the jaws and teeth do not require elastics after surgery.  This is determined by the specific nature of your skeletal condition and will be decided by your surgeon.  When the teeth have elastics holding the jaws together, these will be in place for 1 to 3 weeks.  In some instances the jaws will have to be in elastics for a longer period of time.    WALKING  You are encouraged to begin to walk as soon as possible, even if bone has been taken from the hip for use during jaw surgery.    BITE GUIDES  In many cases a clear plastic splint is placed between your teeth at surgery.  A splint is a plastic (acrylic) device constructed from your dental models that have been placed into the new bite (occlusal relationship).  After the jaw surgery, the teeth are often wired together into the \"splint\" to establish and maintain the correct jaw position.  This will remain in place until the wires are removed, and in most instances, will be used for a time following the release of fixation, typically 3 weeks.    SPEECH  The ease with which you can communicate and be understood is not predictable immediately after surgery.  Your speech will improve, however, by repeated attempts on your part to talk and be understood.  It is important that you slow your " "rate of speech, concentrate on each word, and be willing to try.  Most patients can be understood within 24 hours after surgery but speech may take time to adapt to new jaw position.    CLEANING THE TEETH   You will be encouraged to brush your teeth following each meal.  A child-sized soft toothbrush can be utilized for this purpose, paying particular attention to keeping the brush in direct contact with the teeth.  In addition to brushing, a mouth rinse may be used.  The rinse is made by diluting hydrogen peroxide, 50:50, with any commercial mouth rinse.  Do not use a water irrigating device, such as \"water pic,\" until after 2 weeks following surgery.  These irrigating devices have sufficient force to tear open the sutures in your mouth.  A toothbrush will, however, provide an excellent means of maintaining oral hygiene approximately 2 weeks following surgery.    POSTOPERATIVE PAIN  Pain must be expected. In most instances, however, it is moderate and treated with medications. Every patient experiences pain differently and there is no one size fits all approach. Your surgical team will attempt to tailor your medications to best control your pain. When bone grafts are taken from the hip (or rib or skull) more discomfort should be expected.  Generally, no injections are needed for pain and a liquid medication is all that is required.    MEDICATIONS  During the period of hospitalizations, you will usually be given antibiotics, nasal decongestants, nasal spray, ointment to keep your lips moist, and a pain medication if needed.  Most often these will be discontinued on discharge from the hospital, except for some of the medications, which will be used for 3 days to 2 weeks following surgery. Antibiotics are typically not prescribed after your hospital stay as you have getting them through the IV. An additional course of oral antibiotics may be prescribed but only if deemed necessary by the surgical team. The typical " "regiment of medications include pain medication, stool softener, nasal decongestants, mucolytics and anti-nausea medications. If you feel that you need additional medications please inform your surgical team. Below is a list of medications and the common usages    COMMON MEDICATIONS:  Hydrocodone/Oxycodone - Narcotic pain control  Acetominophen/Ibuprofen - Non-narcotic pain control  Amoxicillin/Clindamycin - Antibiotic, typically a 5-7 day course  Ondansetron/Phenergan/Compazine/Scopolamine - Anti-nausea medications  Guaufenesin - Mucolytic, used to keep mucous from hardening in nasal passages after upper jaw surgery  Sudafed/Claritin - Decongestant, used to keep sinuses clear after upper jaw surgery  Colace/Senna - Stool softener used while taking Narcotic pain medication    Chlorhexidine Rinse - An antiseptic mouth rinse that help reduce the bacterial load in the oral cavity    DIET  During the period of hospitalization we may ask a dietician will discuss \"at-home\" dietary management with you and members of your family.  This will include a diet booklet.  It is suggested that prior to your surgery, you acquire a  and a food strainer.  Commercial dietary supplements such as Ensure   may also be of assistance during the postoperative period; the dietician can suggest those that are recommended.  These can be purchased, without a prescription, in your local pharmacy.  Most have a variety of flavors.     WEIGHT LOSS  Again, a weight loss of 10 to 20 pounds may be anticipated during the postoperative period.  This is usually a reflection of a loss of appetite, rather than the fact that the teeth are \"wired\" together.  Within 2 weeks following surgery, your appetite should be sufficiently improved to maintain and possibly increase your weight.    DAY OF DISCHARGE  Most patients are ready for discharge 0 to 2 days after surgery.  You will be encouraged to resume your normal activities as soon as possible. Your " discharge from the hospital will be dictated by four things. One, your ability to walk independently. Two, your ability to maintain adequate fluid intake to prevent dehydration. Three, your ability and urinate without difficulty and four, that your pain is controlled with medications taken by mouth. After your discharge you will be provided the contact information of the on-call Oral surgery resident. This person is available for questions should any arise after discharge but please read through this entire document prior to calling. The answer may be in the document.     You have tape on the face to protect the hematoma, you can remove the tape and bandage in 5 days, if for some reason this tape should fall off no need to replace after the 5 days.      IN SUMMARY:   1. Keep your mouth clean by gently brushing and rinsing often  2. Keep up some daily activity such as walking  3. Pain in normal, stay ahead of your pain by taking all medications as prescribed  4. Swelling is normal, use ice for 3 days following surgery then switch to warm compresses  6. Bleeding from the incisions and nose is normal and can be expected for 7-10 days after surgery  7. You can use straws or spoons  8. Bruising, even to the chest is normal but does not happen in every case  9. Keep hydrated and consume as many calories as you can. This will speed your recovery.   10. If the elastics on your teeth break or fall off. It is OK to go without for several days or until you next follow up appointment    THINGS TO WATCH OUT FOR:  Any new onset of swelling or redness after 5 days  Any increase in pain after 5 days, your pain should reduce, even if just a little, each day after surgery  Any changes in the way the teeth fit together or how they fit into the splint     In you have any questions please feel free to call our clinic or page the oral surgery resident on call. You may also e-mail me at any time.     [phone] - Ask for the Oral  Surgery Resident on Call

## 2022-12-22 NOTE — PROGRESS NOTES
CLINICAL NUTRITION SERVICES - BRIEF NOTE    Consult received per Provider Order for Nutrition Education - Oral Surgery POST OP (OSTEOTOMY, LE FORT I, WITH MANDIBULAR SAGITTAL SPLIT).   Per chart review, pt currently on a Full Liquids diet and feeding self with use of a syringe.      INTERVENTIONS  Implementation  Nutrition Education: Provided education (verbal and written) on foods allowed and not allowed on a full liquid diet. Encouraged good intakes of calories and protein to help with postop healing and recovery and minimize wt loss.  Collaboration with other providers - bedside RN  Medical food supplement therapy - enter order for Ensure Max Protein betw meals @ 2 pm    Cathy Esquivel RD,LD                                                                                                                         [phone] pager [phone]

## 2022-12-22 NOTE — PLAN OF CARE
Status: POD #0 s/p osteotomy [place] I with mandibular sagittal split. Hx maxillary hypoplasia, asthma  Vitals: VSS on RA  Neuros: Intact ex garbled speech  IV: PIV infusing LR @ 100 mL/hr  Resp/trach: Clear lungs, RA  Diet: Full liquid, good PO  Bowel status: LBM PTA  : Voiding  Skin: Intraoral and facial incisions UTV, pressure tape in place. Moderate amount of bloody oral secretions, using red frank to suction independently. Jaw bra in place, replace ice PRN. Nasal packing in R nare UTV  Pain: Jaw and headache pain controlled with PRN tylenol, oxycodone  Activity: SBA  Social: Sister at bedside this evening, helpful/supportive  Plan/updates: Continue POC    Arrived from: PACU  Belongings/meds: Cell phone, clothing - remain w patient  2 RN Skin Assessment Completed by: Jane CUELLAR  Non-intact findings documented (yes/no/NA): Yes

## 2022-12-23 ENCOUNTER — HOSPITAL ENCOUNTER (EMERGENCY)
Facility: CLINIC | Age: 22
Discharge: HOME | End: 2022-12-23
Attending: EMERGENCY MEDICINE | Admitting: EMERGENCY MEDICINE
Payer: COMMERCIAL

## 2022-12-23 VITALS
HEIGHT: 75 IN | BODY MASS INDEX: 30.78 KG/M2 | SYSTOLIC BLOOD PRESSURE: 134 MMHG | OXYGEN SATURATION: 99 % | TEMPERATURE: 99.4 F | DIASTOLIC BLOOD PRESSURE: 74 MMHG | HEART RATE: 74 BPM

## 2022-12-23 DIAGNOSIS — R22.0 FACIAL SWELLING: ICD-10-CM

## 2022-12-23 LAB
HOLD SPECIMEN: NORMAL

## 2022-12-23 PROCEDURE — 258N000003 HC RX IP 258 OP 636: Performed by: EMERGENCY MEDICINE

## 2022-12-23 PROCEDURE — 250N000011 HC RX IP 250 OP 636: Performed by: EMERGENCY MEDICINE

## 2022-12-23 PROCEDURE — 99285 EMERGENCY DEPT VISIT HI MDM: CPT | Mod: 25 | Performed by: EMERGENCY MEDICINE

## 2022-12-23 PROCEDURE — 99285 EMERGENCY DEPT VISIT HI MDM: CPT | Performed by: EMERGENCY MEDICINE

## 2022-12-23 PROCEDURE — 96361 HYDRATE IV INFUSION ADD-ON: CPT | Performed by: EMERGENCY MEDICINE

## 2022-12-23 PROCEDURE — 96374 THER/PROPH/DIAG INJ IV PUSH: CPT | Performed by: EMERGENCY MEDICINE

## 2022-12-23 RX ORDER — ECHINACEA PURPUREA EXTRACT 125 MG
TABLET ORAL
Qty: 15 ML | Refills: 0 | Status: SHIPPED | OUTPATIENT
Start: 2022-12-23

## 2022-12-23 RX ORDER — OXYMETAZOLINE HYDROCHLORIDE 0.05 G/100ML
2 SPRAY NASAL 2 TIMES DAILY
Qty: 1 ML | Refills: 0 | Status: SHIPPED | OUTPATIENT
Start: 2022-12-23 | End: 2022-12-26

## 2022-12-23 RX ORDER — SODIUM CHLORIDE 9 MG/ML
INJECTION, SOLUTION INTRAVENOUS CONTINUOUS
Status: DISCONTINUED | OUTPATIENT
Start: 2022-12-23 | End: 2022-12-23 | Stop reason: HOSPADM

## 2022-12-23 RX ORDER — MORPHINE SULFATE 2 MG/ML
2 INJECTION, SOLUTION INTRAMUSCULAR; INTRAVENOUS
Status: COMPLETED | OUTPATIENT
Start: 2022-12-23 | End: 2022-12-23

## 2022-12-23 RX ADMIN — SODIUM CHLORIDE 1000 ML: 9 INJECTION, SOLUTION INTRAVENOUS at 18:40

## 2022-12-23 RX ADMIN — MORPHINE SULFATE 2 MG: 2 INJECTION, SOLUTION INTRAMUSCULAR; INTRAVENOUS at 18:35

## 2022-12-23 ASSESSMENT — ACTIVITIES OF DAILY LIVING (ADL): ADLS_ACUITY_SCORE: 35

## 2022-12-23 ASSESSMENT — ENCOUNTER SYMPTOMS: NAUSEA: 1

## 2022-12-23 NOTE — ED TRIAGE NOTES
Triage Assessment     Row Name 12/23/22 9700       Triage Assessment (Adult)    Airway WDL X;airway symptoms    Airway Symptoms partially obstructed    Additional Documentation Edema (Group)       Respiratory WDL    Respiratory WDL X    Rhythm/Pattern, Respiratory shortness of breath    Nailbeds no discoloration    Mucous Membranes cracked;dry    Cough Frequency no cough       Skin Circulation/Temperature WDL    Skin Circulation/Temperature WDL WDL       Cardiac WDL    Cardiac WDL WDL       Peripheral/Neurovascular WDL    Peripheral Neurovascular WDL WDL;capillary refill    Capillary Refill, General less than/equal to 3 secs       Edema    Edema face;periorbital;tongue    Face Edema 3+ (Moderate)    Periorbital Edema 3+ (Moderate)    Tongue Edema 3+ (Moderate)       Cognitive/Neuro/Behavioral WDL    Cognitive/Neuro/Behavioral WDL WDL

## 2022-12-23 NOTE — ED TRIAGE NOTES
"IVAN from home  Jaw surgery [facility] ago [facility]  Difficulty breathing today for 2 hours while supine due to increased bilateral jaw swelling  Increased swallowing difficulty but able to swallow  Claimed his 'heart was racing\" en route  Denies current heart racing  EMS stated rate was 78 bpm      "

## 2022-12-23 NOTE — ED NOTES
Bed: ED29  Expected date:   Expected time:   Means of arrival:   Comments:  [facility] 21 y/o facial swelling

## 2022-12-24 NOTE — CONSULTS
ORAL & MAXILLOFACIAL SURGERY   CONSULT  Kristine Bravo,  MRN: [id number],  : 2000        ASSESSMENT   Kristine Bravo is a 22 year old male with a history of maxillary hypoplasia and class II malocclusion status post [place] I sagittal osteotomy with mandibular sagittal split on 22 presents to [facility] ED with post-op dysphagia.      PLAN  -Intra oral suctioning with Yankauer suction completed bedside. Patient able to swallow better and feels more comfortable  -Recommend afrin, liquid Pseudoephed and nasal ocean spray  -encouraged liquid diet (smoothies and broth) and keeping hydrated  -Follow up with OMFS clinic next week.    Please contact the OMFS resident on-call with questions or concerns.    Discussed with PGY-3 resident (Dr. Gary Montalvo) and staff surgeon Dr Anne Marrero.    Marty Soares, VICKIS  Oral & Maxillofacial Surgery Intern    ____________________________________________      HPI  Kristine Bravo is a 22 year old male with history of maxillary hypoplasia and class II malocclusion status post [place] I sagittal osteotomy with mandibular sagittal split on 22 who presents with post op dysphagia at the [facility] ED. Patient was just discharged from the hospital yesterday afternoon. Today he noticed increased bilateral jaw swelling and difficulty breathing while laying flat. He has been able to swallow and his breathing is fine while sitting upright but has noticed increased difficulty swallowing as his saliva has been thick. Mother reports feeding him cream of wheat for nutrition. Patient reports he has not drank a lot of clear liquids.  He denies any bleeding or significant pain at this time. Pain well controlled with discharge meds. He denies any SOB or dyspnea. Overall following normal post-operative course without any complications at this time. Denies any other constitutional symptoms.    HISTORY  Past Medical History:   Past Medical History:   Diagnosis Date     Uncomplicated  asthma      Past Surgical History:   Past Surgical History:   Procedure Laterality Date     LE FORT ONE , OSTEOTOMY SAGITTAL SPLIT, COMBINED N/A 12/21/2022    Procedure: OSTEOTOMY, LE FORT I, WITH MANDIBULAR SAGITTAL SPLIT;  Surgeon: Anne Marrero DDS;  Location:  OR     Medications:   No current facility-administered medications on file prior to encounter.  acetaminophen (TYLENOL) 160 MG/5ML solution, Take 20 mLs (640 mg) by mouth every 6 hours as needed for fever or mild pain  oxyCODONE (ROXICODONE) 5 MG/5ML solution, Take 5 mLs (5 mg) by mouth every 6 hours as needed for severe pain (7-10)  albuterol (PROAIR HFA/PROVENTIL HFA/VENTOLIN HFA) 108 (90 Base) MCG/ACT inhaler, Inhale 2 puffs into the lungs every 6 hours as needed for shortness of breath, wheezing or cough  amoxicillin-clavulanate (AUGMENTIN) 400-57 MG/5ML suspension, Take 10 mLs (800 mg) by mouth 2 times daily  cetirizine (ZYRTEC) 10 MG tablet, Take 10 mg by mouth daily  chlorhexidine (PERIDEX) 0.12 % solution, Swish and spit 15 mLs in mouth 2 times daily  finasteride (PROPECIA) 1 MG tablet, Take 1 tablet by mouth daily  fluticasone (FLONASE) 50 MCG/ACT nasal spray,   ibuprofen (ADVIL/MOTRIN) 100 MG/5ML suspension, Take 30 mLs (600 mg) by mouth every 6 hours as needed for fever or moderate pain (4-6)  ibuprofen (ADVIL/MOTRIN) 200 MG tablet, Take 200 mg by mouth every 6 hours as needed for pain  loratadine 10 MG capsule, Take 10 mg by mouth daily  NALTREXONE HCL PO, 4.5 mg by Other route daily  NONFORMULARY, [id number] (modified HGH). Unclear what dosage he takes         Allergies: No Known Allergies  Social History:   Social History     Socioeconomic History     Marital status: Single     Spouse name: Not on file     Number of children: Not on file     Years of education: Not on file     Highest education level: Not on file   Occupational History     Not on file   Tobacco Use     Smoking status: Never     Smokeless tobacco: Not on file   Substance  "and Sexual Activity     Alcohol use: No     Drug use: No     Sexual activity: Yes   Other Topics Concern     Not on file   Social History Narrative     Not on file     Social Determinants of Health     Financial Resource Strain: Not on file   Food Insecurity: Not on file   Transportation Needs: Not on file   Physical Activity: Not on file   Stress: Not on file   Social Connections: Not on file   Intimate Partner Violence: Not on file   Housing Stability: Not on file       REVIEW OF SYSTEMS  10 point ROS reviewed and negative aside from listed in HPI    PHYSICAL EXAM  Vital Signs:   Vitals:    12/23/22 1752 12/23/22 1900   BP: 132/81 134/74   Pulse: 75 74   Temp: 99.4  F (37.4  C)    TempSrc: Axillary    SpO2: 100% 99%   Height: 1.905 m (6' 3\")        GEN: WD/WN male, NAD  HEENT: NC/AT, EOMI, PERRL, bilateral facial swelling, lower lip edema consistent with post surgical changes, dried epistaxis of nares bilaterally. Dressing pack intact of right cheek secured by tape. Maxilla is well perfused, not fully into occlusion with the surgical splint,  incisions clean and intact, no wound dehiscence. ALBERTO ~15mm. Hemodynamically stable intra-orally. Significant amount of thick mucus and minor amount of blood located in posterior pharynx and bilateral buccal vestibules. Will plan to suction and help clear airway.  CV:WWP  PULM: breathing comfortably on room air  MSK: [place], no peripheral extremity edema  NEURO: AAOx4, CN II-XII intact bilaterally  PSYCH: Appropriate mood and affect           REVIEW OF LABORATORY DATA  Most Recent 3 CBC's:No lab results found.   Most Recent 3 BMP's:  Recent Labs   Lab Test 12/22/22  0749 12/22/22  0657 12/21/22  0626   CR 0.73  --   --    GLC  --  118* 105*     Most Recent 2 LFT's:No lab results found.  Most Recent INR's and Anticoagulation Dosing History:  Anticoagulation Dose History    There is no flowsheet data to display.       Most Recent 3 Troponin's:No lab results found.  Most Recent " Cholesterol Panel:No lab results found.  Most Recent 6 Bacteria Isolates From Any Culture (See EPIC Reports for Culture Details):No lab results found.  Most Recent TSH, T4 and A1c Labs:No lab results found.    IMAGING RESULTS (Include outside hospital results)     N/A

## 2022-12-24 NOTE — ED PROVIDER NOTES
[facility] ([facility])    12/23/22       ED PROVIDER NOTE   ED 29    History     Chief Complaint   Patient presents with     Facial Swelling     The history is provided by the patient and medical records.     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 He was just discharged from the hospital yesterday afternoon. Today he noticed increased bilateral jaw swelling and difficulty breathing while laying flat. He has been able to swallow but has noticed increased difficulty as well as his saliva has been thick.  He denies any bleeding.  He reports feeling warm today, did not take his temperature.       I have reviewed the Medications, Allergies, Past Medical and Surgical History, and Social History in the [employer] system.  Past Medical History:   Diagnosis Date     Uncomplicated asthma        Past Surgical History:   Procedure Laterality Date     LE FORT ONE , OSTEOTOMY SAGITTAL SPLIT, COMBINED N/A 12/21/2022    Procedure: OSTEOTOMY, LE FORT I, WITH MANDIBULAR SAGITTAL SPLIT;  Surgeon: Anne Marrero DDS;  Location:  OR       No family history on file.    Social History     Tobacco Use     Smoking status: Never     Smokeless tobacco: Not on file   Substance Use Topics     Alcohol use: No     No current facility-administered medications for this encounter.     Current Outpatient Medications   Medication     acetaminophen (TYLENOL) 160 MG/5ML solution     oxyCODONE (ROXICODONE) 5 MG/5ML solution     albuterol (PROAIR HFA/PROVENTIL HFA/VENTOLIN HFA) 108 (90 Base) MCG/ACT inhaler     amoxicillin-clavulanate (AUGMENTIN) 400-57 MG/5ML suspension     cetirizine (ZYRTEC) 10 MG tablet     chlorhexidine (PERIDEX) 0.12 % solution     finasteride (PROPECIA) 1 MG tablet     fluticasone (FLONASE) 50 MCG/ACT nasal spray      "ibuprofen (ADVIL/MOTRIN) 100 MG/5ML suspension     ibuprofen (ADVIL/MOTRIN) 200 MG tablet     loratadine 10 MG capsule     NALTREXONE HCL PO     NONFORMULARY      No Known Allergies     Review of Systems   Gastrointestinal: Positive for nausea.   All other systems reviewed and are negative.      Physical Exam   BP: 132/81  Pulse: 75  Temp: 99.4  F (37.4  C)  Height: 190.5 cm (6' 3\")  SpO2: 100 %      Physical Exam  General: patient is alert and oriented   Head: atraumatic and normocephalic   EENT: foam tape in place over right cheek and across face, tacky mucous membranes, jaw held in open position, no open lesions noted but exam is limited  Neck: mild swelling of the submandibular tissue, no induration   Cardiovascular: regular rate and rhythm, extremities warm and well perfused  Pulmonary: lungs clear to auscultation bilaterally   Abdomen: soft, non-tender   Musculoskeletal: normal range of motion   Neurological: alert and oriented, moving all extremities symmetrically, gait normal   Skin: warm, dry     ED Course        Procedures         Labs Ordered and Resulted from Time of ED Arrival to Time of ED Departure - No data to display         Assessments & Plan (with Medical Decision Making)   Mr. 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  He is hemodynamically stable, afebrile and in no respiratory distress.  He is breathing comfortably without any stridor.  He is not drooling and is tolerating his secretions well.  Oral surgery was consulted and has seen and evaluated that patient.  No evidence of infection or bleeding.  Recommended discharge with oral sudafed, nasal afrin and saline spray.  He will follow up with oral surgery as scheduled.  Given return precautions and voiced understanding.      I have reviewed the nursing notes.    I have reviewed " the findings, diagnosis, plan and need for follow up with the patient.    New Prescriptions    OXYMETAZOLINE (AFRIN NASAL SPRAY) 0.05 % NASAL SPRAY    Spray 2 sprays in nostril 2 times daily for 3 days    PSEUDOEPHEDRINE (SUDAFED) 30 MG/5ML SYRUP    Take 5 mLs (30 mg) by mouth 4 times daily as needed for congestion    SODIUM CHLORIDE (OCEAN) 0.65 % NASAL SPRAY    Use saline spray twice a day for the next 7 days.       Final diagnoses:   Facial swelling     Kathy Paz MD     12/23/2022   [facility] EMERGENCY DEPARTMENT      Kathy Paz MD  12/23/22 2011

## 2022-12-24 NOTE — DISCHARGE INSTRUCTIONS
Please make an appointment to follow up with oral surgery as scheduled.       Use sudafed and afrin as needed for congestion.  Use nasal saline spray as prescribed.  Continue to eat a liquid diet.     If you have any worsening symptoms including increased pain, swelling, bleeding, fevers or other concerns, return to the emergency department for re-evaluation.

## 2022-12-27 NOTE — DISCHARGE SUMMARY
[facility]    Oral & Maxillofacial Surgery - Discharge Summary    Date of Admission:  12/21/2022  Date of Discharge:  12/22/2022  4:30 PM  Discharging Attending Provider: Dr. 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 The patient was met in pre-op, where the procedure was reviewed and all questions were answered. The patient was then taken to the OR. His procedure was uncomplicated. The patient was admitted afterwards for routine post-operative monitoring, and pain management. Overall, the patient's postoperative course was uneventful. Pain managed well on current regimen. Managing liquid diet well. Ambulating and voiding spontaneously. Patient was deemed stable for discharge on 12/22/22.      Code Status   Full Code       The patient was discussed with Dr. Anne Soares DDS  Oral & Maxillofacial Surgery Intern  ______________________________________________________________________    Physical Exam   Vital Signs:                   Weight: 246 lbs 4.06 oz    GEN: WD/WN female, NAD  HEENT: NC/AT, EOMI, PERRL, bilateral facial swelling, lower lip edema consistent with post surgical changes, dried epistaxis of nares bilaterally. Dressing pack intact in right nare with no drainage present. Secured by tape. Will plan for removal. Maxilla is well perfused, not fully into occlusion with the surgical splint, elastics placed this  morning bilateral canines, incisions clean and intact, no wound dehiscence.   CV: RRR  PULM: CTAB, breathing comfortably on room air  MSK: B[place], no peripheral extremity edema  NEURO: AAOx4, CN II-XII intact bilaterally  PSYCH: Appropriate mood and affect    Significant Results and Procedures   Most Recent 3 CBC's:No lab results found.         Primary Care Physician   Physician No Ref-Primary    Discharge Disposition   Discharged to home  Condition at discharge: Stable    Discharge Orders      Reason for your hospital stay    You had jaw surgery     Activity    Your activity upon discharge: walking and stretching is highly encouraged. No strenuous activity. Will discuss advancing activities in future follow up visits     Follow Up and recommended labs and tests    Follow up with OMFS Clinic on 12/29/22 at 11:15am    Oral and Maxillofacial Surgery Clinic - [facility]  7th floor of [address] [address]  Clinic phone number: [phone]  Clinic fax number: [phone]     Diet    Follow this diet upon discharge: soft/non-chew diet for 6 weeks     Discharge Medications   Discharge Medication List as of 12/22/2022  2:17 PM      START taking these medications    Details   acetaminophen (TYLENOL) 160 MG/5ML solution Take 20 mLs (640 mg) by mouth every 6 hours as needed for fever or mild pain, Disp-946 mL, R-0, E-Prescribe      amoxicillin-clavulanate (AUGMENTIN) 400-57 MG/5ML suspension Take 10 mLs (800 mg) by mouth 2 times daily, Disp-140 mL, R-0, E-Prescribe      chlorhexidine (PERIDEX) 0.12 % solution Swish and spit 15 mLs in mouth 2 times daily, Disp-473 mL, R-2, E-Prescribe      ibuprofen (ADVIL/MOTRIN) 100 MG/5ML suspension Take 30 mLs (600 mg) by mouth every 6 hours as needed for fever or moderate pain (4-6), Disp-946 mL, R-0, E-Prescribe      oxyCODONE (ROXICODONE) 5 MG/5ML solution Take 5 mLs (5 mg) by mouth every 6 hours as needed for severe pain  (7-10), Disp-80 mL, R-0, E-Prescribe         CONTINUE these medications which have NOT CHANGED    Details   cetirizine (ZYRTEC) 10 MG tablet Take 10 mg by mouth daily, Historical      finasteride (PROPECIA) 1 MG tablet Take 1 tablet by mouth daily, Historical      fluticasone (FLONASE) 50 MCG/ACT nasal spray Historical      NALTREXONE HCL PO 4.5 mg by Other route daily, Historical         STOP taking these medications       albuterol (PROVENTIL) (2.5 MG/3ML) 0.083% neb solution Comments:   Reason for Stopping:         albuterol (VENTOLIN HFA) 108 (90 BASE) MCG/ACT Inhaler Comments:   Reason for Stopping:         HYDROcodone-acetaminophen (NORCO) 5-325 MG per tablet Comments:   Reason for Stopping:         ibuprofen (ADVIL/MOTRIN) 800 MG tablet Comments:   Reason for Stopping:         loratadine (CLARITIN) 10 MG tablet Comments:   Reason for Stopping:         montelukast (SINGULAIR) 10 MG tablet Comments:   Reason for Stopping:         phentermine (ADIPEX-P) 37.5 MG capsule Comments:   Reason for Stopping:         predniSONE (DELTASONE) 20 MG tablet Comments:   Reason for Stopping:             Allergies   No Known Allergies

## 2023-06-03 ENCOUNTER — HEALTH MAINTENANCE LETTER (OUTPATIENT)
Age: 23
End: 2023-06-03

## 2024-07-13 ENCOUNTER — HEALTH MAINTENANCE LETTER (OUTPATIENT)
Age: 24
End: 2024-07-13

## 2025-07-19 ENCOUNTER — HEALTH MAINTENANCE LETTER (OUTPATIENT)
Age: 25
End: 2025-07-19

## (undated) DEVICE — TUBING SET ASPIRATION W/EXT SONOPET  LF 5450-850-003

## (undated) DEVICE — SOL NACL 0.9% IRRIG 1000ML BOTTLE 2F7124

## (undated) DEVICE — ESU NDL COLORADO MICRO E1651

## (undated) DEVICE — DRILL BIT STRK LINDEMANN 2.2MM 1608-002-043

## (undated) DEVICE — SOL WATER IRRIG 1000ML BOTTLE 2F7114

## (undated) DEVICE — Device

## (undated) DEVICE — ESU CORD BIPOLAR GREEN 10-4000

## (undated) DEVICE — PREP POVIDONE IODINE SOLUTION 10% 4OZ BOTTLE 29906-004

## (undated) DEVICE — PREP POVIDONE-IODINE 7.5% SCRUB 4OZ BOTTLE MDS093945

## (undated) DEVICE — PENCIL PRESHARPENED SOFT #2 1/PK  17701/50

## (undated) DEVICE — IMP SCR SYN MATRIX COARSE PITCH 1.85X12MM ST 04.511.212.01: Type: IMPLANTABLE DEVICE | Site: MANDIBLE | Status: NON-FUNCTIONAL

## (undated) DEVICE — ADH LIQUID MASTISOL TOPICAL VIAL 2-3ML 0523-48

## (undated) DEVICE — EYE SHIELD CORNEAL CROUCH  E5699

## (undated) DEVICE — KNIFE SERRATE AGGRESSIVE SONOPET 12.4X8MM 5450-815-114

## (undated) DEVICE — PREP SKIN SCRUB TRAY 4461A

## (undated) DEVICE — DRAPE MAYO STAND 23X54 8337

## (undated) DEVICE — IMP SCR SYN MATRIX 2.1X6MM SELF TAP 04.511.236.01: Type: IMPLANTABLE DEVICE | Status: NON-FUNCTIONAL

## (undated) DEVICE — IMP SCR SYN MATRIX 1.85X6MM SELF DRILL 04.511.226.01: Type: IMPLANTABLE DEVICE | Site: MAXILLA | Status: NON-FUNCTIONAL

## (undated) DEVICE — DRILL BIT SYN MATRIX J LATCH 1.4X8MM STOP 44.5MM 03.511.248

## (undated) DEVICE — BUR STRK ROUND DIAMOND 4.0X54MM COARSE 1608-006-095

## (undated) DEVICE — ESU GROUND PAD ADULT W/CORD E7507

## (undated) DEVICE — DRILL BIT SYN 1.8X100 TROCAR CALIBRATED QC 317.876

## (undated) DEVICE — RX SURGIFLO HEMOSTATIC MATRIX W/THROMBIN 8ML 2994

## (undated) DEVICE — BUR STRK EGG 4.0X44.5MM 10 FLUTE 1607-002-035

## (undated) DEVICE — SUCTION MANIFOLD NEPTUNE 2 SYS 4 PORT 0702-020-000

## (undated) DEVICE — TOOTHBRUSH ADULT NON STERILE MDS136850

## (undated) DEVICE — SU CHROMIC 3-0 SH 27" G122H

## (undated) DEVICE — IMPLANTABLE DEVICE: Type: IMPLANTABLE DEVICE | Site: MANDIBLE | Status: NON-FUNCTIONAL

## (undated) DEVICE — CATH TRAY FOLEY SURESTEP 16FR W/URNE MTR STLK LATEX A303316A

## (undated) DEVICE — DRSG GAUZE 4X4" TRAY 6939

## (undated) DEVICE — PACK NEURO MINOR UMMC SNE32MNMU4

## (undated) DEVICE — DRSG JAWBRA  95

## (undated) DEVICE — SOL NACL 0.9% INJ 1000ML BAG 2B1324X

## (undated) DEVICE — LINEN TOWEL PACK X5 5464

## (undated) DEVICE — LINEN TOWEL PACK X6 WHITE 5487

## (undated) RX ORDER — HYDROMORPHONE HYDROCHLORIDE 1 MG/ML
INJECTION, SOLUTION INTRAMUSCULAR; INTRAVENOUS; SUBCUTANEOUS
Status: DISPENSED
Start: 2022-12-21

## (undated) RX ORDER — FENTANYL CITRATE 50 UG/ML
INJECTION, SOLUTION INTRAMUSCULAR; INTRAVENOUS
Status: DISPENSED
Start: 2022-12-21

## (undated) RX ORDER — SCOLOPAMINE TRANSDERMAL SYSTEM 1 MG/1
PATCH, EXTENDED RELEASE TRANSDERMAL
Status: DISPENSED
Start: 2022-12-21

## (undated) RX ORDER — CHLORHEXIDINE GLUCONATE ORAL RINSE 1.2 MG/ML
SOLUTION DENTAL
Status: DISPENSED
Start: 2022-12-21

## (undated) RX ORDER — OXYMETAZOLINE HYDROCHLORIDE 0.05 G/100ML
SPRAY NASAL
Status: DISPENSED
Start: 2022-12-21

## (undated) RX ORDER — DEXMEDETOMIDINE HYDROCHLORIDE 4 UG/ML
INJECTION, SOLUTION INTRAVENOUS
Status: DISPENSED
Start: 2022-12-21

## (undated) RX ORDER — PROPOFOL 10 MG/ML
INJECTION, EMULSION INTRAVENOUS
Status: DISPENSED
Start: 2022-12-21

## (undated) RX ORDER — CEFAZOLIN SODIUM 1 G/3ML
INJECTION, POWDER, FOR SOLUTION INTRAMUSCULAR; INTRAVENOUS
Status: DISPENSED
Start: 2022-12-21

## (undated) RX ORDER — ONDANSETRON 2 MG/ML
INJECTION INTRAMUSCULAR; INTRAVENOUS
Status: DISPENSED
Start: 2022-12-21

## (undated) RX ORDER — KETOROLAC TROMETHAMINE 30 MG/ML
INJECTION, SOLUTION INTRAMUSCULAR; INTRAVENOUS
Status: DISPENSED
Start: 2022-12-21

## (undated) RX ORDER — DEXAMETHASONE SODIUM PHOSPHATE 4 MG/ML
INJECTION, SOLUTION INTRA-ARTICULAR; INTRALESIONAL; INTRAMUSCULAR; INTRAVENOUS; SOFT TISSUE
Status: DISPENSED
Start: 2022-12-21

## (undated) RX ORDER — ESMOLOL HYDROCHLORIDE 10 MG/ML
INJECTION INTRAVENOUS
Status: DISPENSED
Start: 2022-12-21

## (undated) RX ORDER — CEFAZOLIN SODIUM/WATER 2 G/20 ML
SYRINGE (ML) INTRAVENOUS
Status: DISPENSED
Start: 2022-12-21

## (undated) RX ORDER — GABAPENTIN 300 MG/1
CAPSULE ORAL
Status: DISPENSED
Start: 2022-12-21

## (undated) RX ORDER — SODIUM CHLORIDE, SODIUM LACTATE, POTASSIUM CHLORIDE, CALCIUM CHLORIDE 600; 310; 30; 20 MG/100ML; MG/100ML; MG/100ML; MG/100ML
INJECTION, SOLUTION INTRAVENOUS
Status: DISPENSED
Start: 2022-12-21

## (undated) RX ORDER — ACETAMINOPHEN 325 MG/1
TABLET ORAL
Status: DISPENSED
Start: 2022-12-21